# Patient Record
Sex: MALE | Race: WHITE | NOT HISPANIC OR LATINO | Employment: FULL TIME | URBAN - METROPOLITAN AREA
[De-identification: names, ages, dates, MRNs, and addresses within clinical notes are randomized per-mention and may not be internally consistent; named-entity substitution may affect disease eponyms.]

---

## 2017-01-13 ENCOUNTER — OFFICE VISIT (OUTPATIENT)
Dept: INTERNAL MEDICINE | Facility: CLINIC | Age: 57
End: 2017-01-13
Payer: COMMERCIAL

## 2017-01-13 VITALS
DIASTOLIC BLOOD PRESSURE: 70 MMHG | HEART RATE: 60 BPM | SYSTOLIC BLOOD PRESSURE: 114 MMHG | BODY MASS INDEX: 20.97 KG/M2 | TEMPERATURE: 98 F | WEIGHT: 130.5 LBS | RESPIRATION RATE: 16 BRPM | HEIGHT: 66 IN

## 2017-01-13 DIAGNOSIS — Z12.11 COLON CANCER SCREENING: ICD-10-CM

## 2017-01-13 DIAGNOSIS — Z00.00 WELL ADULT EXAM: Primary | ICD-10-CM

## 2017-01-13 PROCEDURE — 99396 PREV VISIT EST AGE 40-64: CPT | Mod: S$GLB,,, | Performed by: FAMILY MEDICINE

## 2017-01-13 PROCEDURE — 99999 PR PBB SHADOW E&M-EST. PATIENT-LVL III: CPT | Mod: PBBFAC,,, | Performed by: FAMILY MEDICINE

## 2017-01-13 NOTE — PROGRESS NOTES
Subjective:       Patient ID: Homar Brynes is a 56 y.o. male.    Chief Complaint: Annual Exam    HPI 56-year-old male presents to clinic today for annual physical exam.  He reports no significant past medical history, past surgical history, or family medical history.  He declines vaccination at this time.  Colonoscopy has been discussed and will be scheduled.  Review of Systems   Constitutional: Negative for appetite change, chills, fatigue and fever.   HENT: Negative for congestion, ear pain, hearing loss, postnasal drip, rhinorrhea, sinus pressure, sore throat and tinnitus.    Eyes: Negative for redness, itching and visual disturbance.   Respiratory: Negative for cough, chest tightness and shortness of breath.    Cardiovascular: Negative for chest pain and palpitations.   Gastrointestinal: Negative for abdominal pain, constipation, diarrhea, nausea and vomiting.   Genitourinary: Negative for decreased urine volume, difficulty urinating, dysuria, frequency, hematuria and urgency.   Musculoskeletal: Negative for back pain, myalgias, neck pain and neck stiffness.   Skin: Negative for rash.   Neurological: Negative for dizziness, light-headedness and headaches.   Psychiatric/Behavioral: Negative.        Objective:      Physical Exam   Constitutional: He is oriented to person, place, and time. He appears well-developed and well-nourished. No distress.   HENT:   Head: Normocephalic and atraumatic.   Right Ear: External ear normal.   Left Ear: External ear normal.   Nose: Nose normal.   Mouth/Throat: Oropharynx is clear and moist. No oropharyngeal exudate.   Eyes: Conjunctivae and EOM are normal. Pupils are equal, round, and reactive to light. Right eye exhibits no discharge. Left eye exhibits no discharge. No scleral icterus.   Neck: Normal range of motion. Neck supple. No JVD present. No tracheal deviation present. No thyromegaly present.   Cardiovascular: Normal rate, regular rhythm, normal heart sounds  and intact distal pulses.  Exam reveals no gallop and no friction rub.    No murmur heard.  Pulmonary/Chest: Effort normal and breath sounds normal. No stridor. No respiratory distress. He has no wheezes. He has no rales.   Abdominal: Soft. Bowel sounds are normal. He exhibits no distension and no mass. There is no tenderness. There is no rebound and no guarding.   Musculoskeletal: Normal range of motion. He exhibits no edema or tenderness.   Lymphadenopathy:     He has no cervical adenopathy.   Neurological: He is alert and oriented to person, place, and time.   Skin: Skin is warm and dry. No rash noted. He is not diaphoretic. No erythema. No pallor.   Psychiatric: He has a normal mood and affect. His behavior is normal. Judgment and thought content normal.   Nursing note and vitals reviewed.      Assessment:       1. Well adult exam    2. Colon cancer screening        Plan:       1.  CBC, CMP, UA, TSH, free T4, fasting lipids, vitamin D level, PSA, and he will been A1c.  2.  Screening colonoscopy.  3.  Return to clinic as needed or in one year for annual exam.  She

## 2017-01-13 NOTE — MR AVS SNAPSHOT
[place] [facility]   [facility]  [place] [address]  Phone: [phone]  Fax: [phone]                  Homar Byrnes   2017 3:00 PM   Office Visit    Description:  Male : 1960   Provider:  Jesse Fleming MD   Department:  [place] [facility]           Reason for Visit     Annual Exam           Diagnoses this Visit        Comments    Well adult exam    -  Primary     Colon cancer screening                To Do List           To Schedule:     Please call the Endoscopy Department at [phone] to schedule your appointment.          Goals (5 Years of Data)     None      Follow-Up and Disposition     Return in about 1 year (around 2018), or if symptoms worsen or fail to improve.      [facility] On [facility]     [facility] On Call Nurse Care Line -  Assistance  Registered nurses in the [facility] On Call Center provide clinical advisement, health education, appointment booking, and other advisory services.  Call for this free service at [phone].             Medications           Message regarding Medications     Verify the changes and/or additions to your medication regime listed below are the same as discussed with your clinician today.  If any of these changes or additions are incorrect, please notify your healthcare provider.        STOP taking these medications     methocarbamol (ROBAXIN) 750 MG Tab Take 1 tablet (750 mg total) by mouth 4 (four) times daily as needed (Muscle strain).    methocarbamol (ROBAXIN) 750 MG Tab TAKE 1 TABLET (750 MG TOTAL) BY MOUTH 4 (FOUR) TIMES DAILY AS NEEDED (MUSCLE STRAIN).    meloxicam (MOBIC) 15 MG tablet Take 1 tablet (15 mg total) by mouth once daily.           Verify that the below list of medications is an accurate representation of the medications you are currently taking.  If none reported, the list may be blank. If incorrect, please contact your healthcare provider. Carry this list with you in  "case of emergency.           Current Medications            Clinical Reference Information           Vital Signs - Last Recorded  Most recent update: 1/13/2017  3:06 PM by Emily Matias LPN    BP Pulse Temp Resp Ht Wt    114/70 (BP Location: Left arm, Patient Position: Sitting, BP Method: Manual) 60 98.4 °F (36.9 °C) (Oral) 16 5' 6" (1.676 m) 59.2 kg (130 lb 8.2 oz)    BMI                21.07 kg/m2          Blood Pressure          Most Recent Value    BP  114/70      Allergies as of 1/13/2017     No Known Allergies      Immunizations Administered on Date of Encounter - 1/13/2017     None      Orders Placed During Today's Visit      Normal Orders This Visit    Case request GI: COLONOSCOPY       "

## 2017-01-17 ENCOUNTER — LAB VISIT (OUTPATIENT)
Dept: LAB | Facility: HOSPITAL | Age: 57
End: 2017-01-17
Attending: FAMILY MEDICINE
Payer: COMMERCIAL

## 2017-01-17 DIAGNOSIS — Z12.5 PROSTATE CANCER SCREENING: ICD-10-CM

## 2017-01-17 DIAGNOSIS — Z00.00 WELL ADULT EXAM: ICD-10-CM

## 2017-01-17 LAB
25(OH)D3+25(OH)D2 SERPL-MCNC: 35 NG/ML
ALBUMIN SERPL BCP-MCNC: 4.1 G/DL
ALP SERPL-CCNC: 64 U/L
ALT SERPL W/O P-5'-P-CCNC: 24 U/L
ANION GAP SERPL CALC-SCNC: 7 MMOL/L
AST SERPL-CCNC: 21 U/L
BASOPHILS # BLD AUTO: 0.02 K/UL
BASOPHILS NFR BLD: 0.4 %
BILIRUB SERPL-MCNC: 0.6 MG/DL
BUN SERPL-MCNC: 13 MG/DL
CALCIUM SERPL-MCNC: 9.3 MG/DL
CHLORIDE SERPL-SCNC: 104 MMOL/L
CHOLEST/HDLC SERPL: 3.4 {RATIO}
CO2 SERPL-SCNC: 29 MMOL/L
COMPLEXED PSA SERPL-MCNC: 3.5 NG/ML
CREAT SERPL-MCNC: 1.2 MG/DL
DIFFERENTIAL METHOD: NORMAL
EOSINOPHIL # BLD AUTO: 0.2 K/UL
EOSINOPHIL NFR BLD: 3.2 %
ERYTHROCYTE [DISTWIDTH] IN BLOOD BY AUTOMATED COUNT: 12.9 %
EST. GFR  (AFRICAN AMERICAN): >60 ML/MIN/1.73 M^2
EST. GFR  (NON AFRICAN AMERICAN): >60 ML/MIN/1.73 M^2
GLUCOSE SERPL-MCNC: 87 MG/DL
HCT VFR BLD AUTO: 48.6 %
HDL/CHOLESTEROL RATIO: 29.3 %
HDLC SERPL-MCNC: 205 MG/DL
HDLC SERPL-MCNC: 60 MG/DL
HGB BLD-MCNC: 16.4 G/DL
LDLC SERPL CALC-MCNC: 124 MG/DL
LYMPHOCYTES # BLD AUTO: 2 K/UL
LYMPHOCYTES NFR BLD: 35.1 %
MCH RBC QN AUTO: 29.7 PG
MCHC RBC AUTO-ENTMCNC: 33.7 %
MCV RBC AUTO: 88 FL
MONOCYTES # BLD AUTO: 0.5 K/UL
MONOCYTES NFR BLD: 8.9 %
NEUTROPHILS # BLD AUTO: 2.9 K/UL
NEUTROPHILS NFR BLD: 52 %
NONHDLC SERPL-MCNC: 145 MG/DL
PLATELET # BLD AUTO: 183 K/UL
PMV BLD AUTO: 12.7 FL
POTASSIUM SERPL-SCNC: 3.7 MMOL/L
PROT SERPL-MCNC: 7.6 G/DL
RBC # BLD AUTO: 5.53 M/UL
SODIUM SERPL-SCNC: 140 MMOL/L
T4 FREE SERPL-MCNC: 0.83 NG/DL
TRIGL SERPL-MCNC: 105 MG/DL
TSH SERPL DL<=0.005 MIU/L-ACNC: 2.17 UIU/ML
WBC # BLD AUTO: 5.61 K/UL

## 2017-01-17 PROCEDURE — 82306 VITAMIN D 25 HYDROXY: CPT

## 2017-01-17 PROCEDURE — 36415 COLL VENOUS BLD VENIPUNCTURE: CPT | Mod: PO

## 2017-01-17 PROCEDURE — 80053 COMPREHEN METABOLIC PANEL: CPT

## 2017-01-17 PROCEDURE — 80061 LIPID PANEL: CPT

## 2017-01-17 PROCEDURE — 85025 COMPLETE CBC W/AUTO DIFF WBC: CPT

## 2017-01-17 PROCEDURE — 84443 ASSAY THYROID STIM HORMONE: CPT

## 2017-01-17 PROCEDURE — 84439 ASSAY OF FREE THYROXINE: CPT

## 2017-01-17 PROCEDURE — 84153 ASSAY OF PSA TOTAL: CPT

## 2017-01-17 PROCEDURE — 83036 HEMOGLOBIN GLYCOSYLATED A1C: CPT

## 2017-01-18 LAB
ESTIMATED AVG GLUCOSE: 105 MG/DL
HBA1C MFR BLD HPLC: 5.3 %

## 2018-01-02 ENCOUNTER — TELEPHONE (OUTPATIENT)
Dept: INTERNAL MEDICINE | Facility: CLINIC | Age: 58
End: 2018-01-02

## 2018-01-02 DIAGNOSIS — Z00.00 WELL ADULT EXAM: Primary | ICD-10-CM

## 2018-01-02 NOTE — TELEPHONE ENCOUNTER
----- Message from Marina Laughlin sent at 1/2/2018 11:26 AM CST -----  Contact: Self [phone]  Doctor appointment and lab have been scheduled.  Please link lab orders to the lab appointment.  Date of doctor appointment:    Physical or EP:  01/30  Date of lab appointment: 01/23   Comments:

## 2018-01-04 NOTE — TELEPHONE ENCOUNTER
Pt is scheduled to see Dr. Fleming for his physical.  Still ordering labs, or send to Dr. Fleming's office?

## 2018-01-16 ENCOUNTER — OFFICE VISIT (OUTPATIENT)
Dept: INTERNAL MEDICINE | Facility: CLINIC | Age: 58
End: 2018-01-16
Payer: COMMERCIAL

## 2018-01-16 VITALS
SYSTOLIC BLOOD PRESSURE: 112 MMHG | BODY MASS INDEX: 20.05 KG/M2 | RESPIRATION RATE: 16 BRPM | DIASTOLIC BLOOD PRESSURE: 80 MMHG | WEIGHT: 124.75 LBS | TEMPERATURE: 98 F | HEIGHT: 66 IN | HEART RATE: 70 BPM

## 2018-01-16 DIAGNOSIS — K40.90 RIGHT INGUINAL HERNIA: Primary | ICD-10-CM

## 2018-01-16 PROCEDURE — 99214 OFFICE O/P EST MOD 30 MIN: CPT | Mod: S$GLB,,, | Performed by: FAMILY MEDICINE

## 2018-01-16 PROCEDURE — 99999 PR PBB SHADOW E&M-EST. PATIENT-LVL III: CPT | Mod: PBBFAC,,, | Performed by: FAMILY MEDICINE

## 2018-01-16 NOTE — PROGRESS NOTES
Subjective:       Patient ID: Homar Byrnes is a 57 y.o. male.    Chief Complaint: Mass (mass on right hip/groin area that pops in and out)    HPI 57-year-old male presents to clinic today secondary to a complaints of a hernia to the right inguinal region that he has noticed for the past week.  He reports that the hernia comes and goes and denies any pain to the area.  Review of Systems   Constitutional: Negative for appetite change, chills, fatigue and fever.   HENT: Negative for congestion, ear pain, hearing loss, postnasal drip, rhinorrhea, sinus pressure, sore throat and tinnitus.    Eyes: Negative for redness, itching and visual disturbance.   Respiratory: Negative for cough, chest tightness and shortness of breath.    Cardiovascular: Negative for chest pain and palpitations.   Gastrointestinal: Negative for abdominal pain, constipation, diarrhea, nausea and vomiting.   Genitourinary: Negative for decreased urine volume, difficulty urinating, dysuria, frequency, hematuria and urgency.        Right groin hernia     Musculoskeletal: Negative for back pain, myalgias, neck pain and neck stiffness.   Skin: Negative for rash.   Neurological: Negative for dizziness, light-headedness and headaches.   Psychiatric/Behavioral: Negative.        Objective:      Physical Exam   Constitutional: He is oriented to person, place, and time. He appears well-developed and well-nourished. No distress.   HENT:   Head: Normocephalic and atraumatic.   Right Ear: External ear normal.   Left Ear: External ear normal.   Nose: Nose normal.   Mouth/Throat: Oropharynx is clear and moist. No oropharyngeal exudate.   Eyes: Conjunctivae and EOM are normal. Pupils are equal, round, and reactive to light. Right eye exhibits no discharge. Left eye exhibits no discharge. No scleral icterus.   Neck: Normal range of motion. Neck supple. No JVD present. No tracheal deviation present. No thyromegaly present.   Cardiovascular: Normal rate,  regular rhythm, normal heart sounds and intact distal pulses.  Exam reveals no gallop and no friction rub.    No murmur heard.  Pulmonary/Chest: Effort normal and breath sounds normal. No stridor. No respiratory distress. He has no wheezes. He has no rales.   Abdominal: Soft. Bowel sounds are normal. He exhibits no distension and no mass. There is no tenderness. There is no rebound and no guarding. A hernia is present. Hernia confirmed positive in the right inguinal area (reducible). Hernia confirmed negative in the left inguinal area.   Musculoskeletal: Normal range of motion. He exhibits no edema or tenderness.   Lymphadenopathy:     He has no cervical adenopathy. No inguinal adenopathy noted on the right or left side.   Neurological: He is alert and oriented to person, place, and time.   Skin: Skin is warm and dry. No rash noted. He is not diaphoretic. No erythema. No pallor.   Psychiatric: He has a normal mood and affect. His behavior is normal. Judgment and thought content normal.   Nursing note and vitals reviewed.      Assessment:       1. Right inguinal hernia        Plan:       1.  Refer to general surgery for further evaluation and treatment of reducible right inguinal hernia.  2.  Return to clinic as needed if symptoms persist or worsen.

## 2018-01-23 ENCOUNTER — LAB VISIT (OUTPATIENT)
Dept: LAB | Facility: HOSPITAL | Age: 58
End: 2018-01-23
Attending: FAMILY MEDICINE
Payer: COMMERCIAL

## 2018-01-23 DIAGNOSIS — Z00.00 WELL ADULT EXAM: ICD-10-CM

## 2018-01-23 LAB
25(OH)D3+25(OH)D2 SERPL-MCNC: 38 NG/ML
ALBUMIN SERPL BCP-MCNC: 3.8 G/DL
ALP SERPL-CCNC: 79 U/L
ALT SERPL W/O P-5'-P-CCNC: 19 U/L
ANION GAP SERPL CALC-SCNC: 10 MMOL/L
AST SERPL-CCNC: 23 U/L
BASOPHILS # BLD AUTO: 0.04 K/UL
BASOPHILS NFR BLD: 0.7 %
BILIRUB SERPL-MCNC: 0.6 MG/DL
BUN SERPL-MCNC: 14 MG/DL
CALCIUM SERPL-MCNC: 9.3 MG/DL
CHLORIDE SERPL-SCNC: 103 MMOL/L
CHOLEST SERPL-MCNC: 187 MG/DL
CHOLEST/HDLC SERPL: 3.2 {RATIO}
CO2 SERPL-SCNC: 26 MMOL/L
CREAT SERPL-MCNC: 0.9 MG/DL
DIFFERENTIAL METHOD: NORMAL
EOSINOPHIL # BLD AUTO: 0.3 K/UL
EOSINOPHIL NFR BLD: 6.2 %
ERYTHROCYTE [DISTWIDTH] IN BLOOD BY AUTOMATED COUNT: 12.2 %
EST. GFR  (AFRICAN AMERICAN): >60 ML/MIN/1.73 M^2
EST. GFR  (NON AFRICAN AMERICAN): >60 ML/MIN/1.73 M^2
ESTIMATED AVG GLUCOSE: 97 MG/DL
GLUCOSE SERPL-MCNC: 85 MG/DL
HBA1C MFR BLD HPLC: 5 %
HCT VFR BLD AUTO: 47.9 %
HDLC SERPL-MCNC: 58 MG/DL
HDLC SERPL: 31 %
HGB BLD-MCNC: 16.2 G/DL
IMM GRANULOCYTES # BLD AUTO: 0.02 K/UL
IMM GRANULOCYTES NFR BLD AUTO: 0.4 %
LDLC SERPL CALC-MCNC: 105 MG/DL
LYMPHOCYTES # BLD AUTO: 2.1 K/UL
LYMPHOCYTES NFR BLD: 37.9 %
MCH RBC QN AUTO: 29.7 PG
MCHC RBC AUTO-ENTMCNC: 33.8 G/DL
MCV RBC AUTO: 88 FL
MONOCYTES # BLD AUTO: 0.4 K/UL
MONOCYTES NFR BLD: 7.6 %
NEUTROPHILS # BLD AUTO: 2.6 K/UL
NEUTROPHILS NFR BLD: 47.2 %
NONHDLC SERPL-MCNC: 129 MG/DL
NRBC BLD-RTO: 0 /100 WBC
PLATELET # BLD AUTO: 162 K/UL
PMV BLD AUTO: 12.4 FL
POTASSIUM SERPL-SCNC: 3.9 MMOL/L
PROT SERPL-MCNC: 7.4 G/DL
RBC # BLD AUTO: 5.45 M/UL
SODIUM SERPL-SCNC: 139 MMOL/L
T4 FREE SERPL-MCNC: 0.81 NG/DL
TRIGL SERPL-MCNC: 120 MG/DL
TSH SERPL DL<=0.005 MIU/L-ACNC: 2.29 UIU/ML
WBC # BLD AUTO: 5.51 K/UL

## 2018-01-23 PROCEDURE — 84443 ASSAY THYROID STIM HORMONE: CPT

## 2018-01-23 PROCEDURE — 36415 COLL VENOUS BLD VENIPUNCTURE: CPT | Mod: PO

## 2018-01-23 PROCEDURE — 85025 COMPLETE CBC W/AUTO DIFF WBC: CPT

## 2018-01-23 PROCEDURE — 82306 VITAMIN D 25 HYDROXY: CPT

## 2018-01-23 PROCEDURE — 84439 ASSAY OF FREE THYROXINE: CPT

## 2018-01-23 PROCEDURE — 80061 LIPID PANEL: CPT

## 2018-01-23 PROCEDURE — 80053 COMPREHEN METABOLIC PANEL: CPT

## 2018-01-23 PROCEDURE — 83036 HEMOGLOBIN GLYCOSYLATED A1C: CPT

## 2018-01-26 ENCOUNTER — OFFICE VISIT (OUTPATIENT)
Dept: SURGERY | Facility: CLINIC | Age: 58
End: 2018-01-26
Payer: COMMERCIAL

## 2018-01-26 VITALS
HEART RATE: 60 BPM | BODY MASS INDEX: 21.17 KG/M2 | TEMPERATURE: 99 F | WEIGHT: 124 LBS | HEIGHT: 64 IN | DIASTOLIC BLOOD PRESSURE: 61 MMHG | SYSTOLIC BLOOD PRESSURE: 123 MMHG

## 2018-01-26 DIAGNOSIS — K40.90 RIGHT INGUINAL HERNIA: Primary | ICD-10-CM

## 2018-01-26 PROCEDURE — 99203 OFFICE O/P NEW LOW 30 MIN: CPT | Mod: S$GLB,,, | Performed by: SURGERY

## 2018-01-26 PROCEDURE — 99999 PR PBB SHADOW E&M-EST. PATIENT-LVL III: CPT | Mod: PBBFAC,,, | Performed by: SURGERY

## 2018-01-26 NOTE — LETTER
January 26, 2018      Jesse Fleming MD  [address] [facility] [place] [address]           [facility]  [address]  [place] [address]  Phone: [phone]          Patient: Homar Byrnes   MR Number: [id number]   YOB: 1960   Date of Visit: 1/26/2018       Dear Dr. Jesse Fleming:    Thank you for referring Homar Byrnes to me for evaluation. Attached you will find relevant portions of my assessment and plan of care.    If you have questions, please do not hesitate to call me. I look forward to following Homar Byrnes along with you.    Sincerely,    Alejandro Trimble Jr., MD    Enclosure  CC:  No Recipients    If you would like to receive this communication electronically, please contact [email or web address] or [phone] to request more information on [employer] Link access.    For providers and/or their staff who would like to refer a patient to [facility], please contact us through our one-stop-shop provider referral line, [facility], at [phone].    If you feel you have received this communication in error or would no longer like to receive these types of communications, please e-mail [email or web address]

## 2018-01-26 NOTE — PROGRESS NOTES
[facility] - General [facility]  General Surgery  History & Physical    Patient Name: Homar Byrnes  MRN: [id number]  Primary Care Provider: Jesse Fleming MD    Subjective:     Chief Complaint: Right inguinal bulge    History of Present Illness:  Patient is a 57 y.o. male presents with right inguinal bulge for 2 weeks.  Non-painful.  Able to be reduced easily.  No signs of incarceration, obstruction, or strangulation.  Otherwise healthy.  No other complaints.    No CP, SOB, GI complaints    No current outpatient prescriptions on file prior to visit.     No current facility-administered medications on file prior to visit.        Review of patient's allergies indicates:  No Known Allergies    No past medical history on file.  No past surgical history on file.  Family History     None        Social History Main Topics    Smoking status: Never Smoker    Smokeless tobacco: Not on file    Alcohol use No    Drug use: No    Sexual activity: Yes     Partners: Female     Review of Systems   10 point ROS negative  Objective:     Vital Signs (Most Recent):  Temp: 98.6 °F (37 °C) (01/26/18 0808)  Pulse: 60 (01/26/18 0808)  BP: 123/61 (01/26/18 0808) Vital Signs (24h Range):  [unfilled]     Weight: 56.2 kg (124 lb)  Body mass index is 21.28 kg/m².    Physical Exam  Gen: NAD  CV: RRR  Pulm: CTAB  GI: Soft, NT, ND.  No abdominal hernias  : Right inguinal hernia  Skin: No rashes  Neuro: Nonfocal      Assessment/Plan:     Right inguinal hernia, not obstructed or incarcerated    Pt does not desire repair at this time as it is asymptomtic  Will call with change in symptoms  Instructed patient on concerning symptoms that would warrant emergent eval      Nando Fernandez MD  General Surgery  [facility] - General Surgery    I have personally taken the history and examined this patient and agree with the resident's note as stated above.         Alejandro Trimble MD

## 2018-01-30 ENCOUNTER — DOCUMENTATION ONLY (OUTPATIENT)
Dept: INTERNAL MEDICINE | Facility: CLINIC | Age: 58
End: 2018-01-30

## 2018-01-30 ENCOUNTER — OFFICE VISIT (OUTPATIENT)
Dept: INTERNAL MEDICINE | Facility: CLINIC | Age: 58
End: 2018-01-30
Payer: COMMERCIAL

## 2018-01-30 VITALS
TEMPERATURE: 98 F | RESPIRATION RATE: 14 BRPM | DIASTOLIC BLOOD PRESSURE: 66 MMHG | BODY MASS INDEX: 21.91 KG/M2 | SYSTOLIC BLOOD PRESSURE: 114 MMHG | WEIGHT: 127.63 LBS | HEART RATE: 58 BPM

## 2018-01-30 DIAGNOSIS — K40.90 RIGHT INGUINAL HERNIA: ICD-10-CM

## 2018-01-30 DIAGNOSIS — Z12.11 COLON CANCER SCREENING: ICD-10-CM

## 2018-01-30 DIAGNOSIS — Z00.00 WELL ADULT EXAM: Primary | ICD-10-CM

## 2018-01-30 PROCEDURE — 99999 PR PBB SHADOW E&M-EST. PATIENT-LVL III: CPT | Mod: PBBFAC,,, | Performed by: FAMILY MEDICINE

## 2018-01-30 PROCEDURE — 99396 PREV VISIT EST AGE 40-64: CPT | Mod: S$GLB,,, | Performed by: FAMILY MEDICINE

## 2018-01-30 NOTE — PROGRESS NOTES
Subjective:       Patient ID: Homar Byrnes is a 57 y.o. male.    Chief Complaint: Annual Exam    HPI  57-year-old male presents to clinic today for annual physical exam.  He reports no significant past medical history, past surgical history, or family medical history.  He was recently seen secondary to a right reducible inguinal hernia for which she has been referred to surgery and at this time he does not desire surgery.  The patient has been instructed on concerning symptoms that would want emergent evaluation and at this time will wait for any changes in symptoms.  At this time he denies any abdominal pain.  Flu vaccine has been discussed but has been declined.  Finally, colon cancer screening has been discussed and the patient wishes to do FIT test screening.  Review of Systems   Constitutional: Negative for appetite change, chills, fatigue and fever.   HENT: Negative for congestion, ear pain, hearing loss, postnasal drip, rhinorrhea, sinus pressure, sore throat and tinnitus.    Eyes: Negative for redness, itching and visual disturbance.   Respiratory: Negative for cough, chest tightness and shortness of breath.    Cardiovascular: Negative for chest pain and palpitations.   Gastrointestinal: Negative for abdominal pain, constipation, diarrhea, nausea and vomiting.   Genitourinary: Negative for decreased urine volume, difficulty urinating, dysuria, frequency, hematuria and urgency.   Musculoskeletal: Negative for back pain, myalgias, neck pain and neck stiffness.   Skin: Negative for rash.   Neurological: Negative for dizziness, light-headedness and headaches.   Psychiatric/Behavioral: Negative.        Objective:      Physical Exam   Constitutional: He is oriented to person, place, and time. He appears well-developed and well-nourished. No distress.   HENT:   Head: Normocephalic and atraumatic.   Right Ear: External ear normal.   Left Ear: External ear normal.   Nose: Nose normal.   Mouth/Throat:  Oropharynx is clear and moist. No oropharyngeal exudate.   Eyes: Conjunctivae and EOM are normal. Pupils are equal, round, and reactive to light. Right eye exhibits no discharge. Left eye exhibits no discharge. No scleral icterus.   Neck: Normal range of motion. Neck supple. No JVD present. No tracheal deviation present. No thyromegaly present.   Cardiovascular: Normal rate, regular rhythm, normal heart sounds and intact distal pulses.  Exam reveals no gallop and no friction rub.    No murmur heard.  Pulmonary/Chest: Effort normal and breath sounds normal. No stridor. No respiratory distress. He has no wheezes. He has no rales.   Abdominal: Soft. Bowel sounds are normal. He exhibits no distension and no mass. There is no tenderness. There is no rebound and no guarding. A hernia is present. Hernia confirmed positive in the right inguinal area (reducible).   Musculoskeletal: Normal range of motion. He exhibits no edema or tenderness.   Lymphadenopathy:     He has no cervical adenopathy.   Neurological: He is alert and oriented to person, place, and time.   Skin: Skin is warm and dry. No rash noted. He is not diaphoretic. No erythema. No pallor.   Psychiatric: He has a normal mood and affect. His behavior is normal. Judgment and thought content normal.   Nursing note and vitals reviewed.      Assessment:       1. Well adult exam    2. Right inguinal hernia    3. Colon cancer screening        Plan:       1.  Labs have been reviewed and are within normal limits.  2.   Patient does not desire surgical repair at this time.  The patient has been instructed on concerning symptoms that would want emergent evaluation of inguinal hernia.  3.  Fit Test.   4.  Return to clinic as needed or in one year for annual exam.

## 2019-04-25 DIAGNOSIS — Z12.11 COLON CANCER SCREENING: ICD-10-CM

## 2020-10-05 ENCOUNTER — PATIENT MESSAGE (OUTPATIENT)
Dept: ADMINISTRATIVE | Facility: HOSPITAL | Age: 60
End: 2020-10-05

## 2021-01-04 ENCOUNTER — PATIENT MESSAGE (OUTPATIENT)
Dept: ADMINISTRATIVE | Facility: HOSPITAL | Age: 61
End: 2021-01-04

## 2021-04-26 ENCOUNTER — PATIENT MESSAGE (OUTPATIENT)
Dept: RESEARCH | Facility: HOSPITAL | Age: 61
End: 2021-04-26

## 2023-01-05 ENCOUNTER — LAB VISIT (OUTPATIENT)
Dept: LAB | Facility: HOSPITAL | Age: 63
End: 2023-01-05
Attending: INTERNAL MEDICINE
Payer: COMMERCIAL

## 2023-01-05 DIAGNOSIS — N40.0 BENIGN ENLARGEMENT OF PROSTATE: ICD-10-CM

## 2023-01-05 DIAGNOSIS — Z11.3 SCREENING EXAMINATION FOR VENEREAL DISEASE: ICD-10-CM

## 2023-01-05 DIAGNOSIS — R73.9 HYPERGLYCEMIA: ICD-10-CM

## 2023-01-05 DIAGNOSIS — D64.9 ANEMIA, UNSPECIFIED: ICD-10-CM

## 2023-01-05 DIAGNOSIS — E53.8 VITAMIN B12 DEFICIENCY (NON ANEMIC): ICD-10-CM

## 2023-01-05 DIAGNOSIS — Z11.59 SCREENING EXAMINATION FOR POLIOMYELITIS: ICD-10-CM

## 2023-01-05 DIAGNOSIS — R09.89 BRUIT: Primary | ICD-10-CM

## 2023-01-05 DIAGNOSIS — E78.2 MIXED HYPERLIPIDEMIA: Primary | ICD-10-CM

## 2023-01-05 LAB
ALBUMIN SERPL BCP-MCNC: 4.1 G/DL (ref 3.5–5.2)
ALP SERPL-CCNC: 70 U/L (ref 55–135)
ALT SERPL W/O P-5'-P-CCNC: 20 U/L (ref 10–44)
ANION GAP SERPL CALC-SCNC: 9 MMOL/L (ref 8–16)
AST SERPL-CCNC: 20 U/L (ref 10–40)
BASOPHILS # BLD AUTO: 0.03 K/UL (ref 0–0.2)
BASOPHILS NFR BLD: 0.6 % (ref 0–1.9)
BILIRUB SERPL-MCNC: 0.7 MG/DL (ref 0.1–1)
BUN SERPL-MCNC: 11 MG/DL (ref 8–23)
CALCIUM SERPL-MCNC: 9.4 MG/DL (ref 8.7–10.5)
CHLORIDE SERPL-SCNC: 106 MMOL/L (ref 95–110)
CHOLEST SERPL-MCNC: 190 MG/DL (ref 120–199)
CHOLEST/HDLC SERPL: 2.9 {RATIO} (ref 2–5)
CO2 SERPL-SCNC: 26 MMOL/L (ref 23–29)
CREAT SERPL-MCNC: 0.9 MG/DL (ref 0.5–1.4)
DIFFERENTIAL METHOD: NORMAL
EOSINOPHIL # BLD AUTO: 0.1 K/UL (ref 0–0.5)
EOSINOPHIL NFR BLD: 2.2 % (ref 0–8)
ERYTHROCYTE [DISTWIDTH] IN BLOOD BY AUTOMATED COUNT: 12.3 % (ref 11.5–14.5)
EST. GFR  (NO RACE VARIABLE): >60 ML/MIN/1.73 M^2
ESTIMATED AVG GLUCOSE: 105 MG/DL (ref 68–131)
FERRITIN SERPL-MCNC: 209 NG/ML (ref 20–300)
GLUCOSE SERPL-MCNC: 92 MG/DL (ref 70–110)
HBA1C MFR BLD: 5.3 % (ref 4–5.6)
HBV SURFACE AG SERPL QL IA: NORMAL
HCT VFR BLD AUTO: 46.2 % (ref 40–54)
HCV AB SERPL QL IA: NORMAL
HDLC SERPL-MCNC: 66 MG/DL (ref 40–75)
HDLC SERPL: 34.7 % (ref 20–50)
HGB BLD-MCNC: 15.6 G/DL (ref 14–18)
HIV 1+2 AB+HIV1 P24 AG SERPL QL IA: NORMAL
IMM GRANULOCYTES # BLD AUTO: 0.02 K/UL (ref 0–0.04)
IMM GRANULOCYTES NFR BLD AUTO: 0.4 % (ref 0–0.5)
IRON SERPL-MCNC: 133 UG/DL (ref 45–160)
IRON SERPL-MCNC: 133 UG/DL (ref 45–160)
LDLC SERPL CALC-MCNC: 108.8 MG/DL (ref 63–159)
LYMPHOCYTES # BLD AUTO: 2.1 K/UL (ref 1–4.8)
LYMPHOCYTES NFR BLD: 41.7 % (ref 18–48)
MCH RBC QN AUTO: 29.4 PG (ref 27–31)
MCHC RBC AUTO-ENTMCNC: 33.8 G/DL (ref 32–36)
MCV RBC AUTO: 87 FL (ref 82–98)
MONOCYTES # BLD AUTO: 0.4 K/UL (ref 0.3–1)
MONOCYTES NFR BLD: 8.3 % (ref 4–15)
NEUTROPHILS # BLD AUTO: 2.3 K/UL (ref 1.8–7.7)
NEUTROPHILS NFR BLD: 46.8 % (ref 38–73)
NONHDLC SERPL-MCNC: 124 MG/DL
NRBC BLD-RTO: 0 /100 WBC
PLATELET # BLD AUTO: 172 K/UL (ref 150–450)
PMV BLD AUTO: 11.4 FL (ref 9.2–12.9)
POTASSIUM SERPL-SCNC: 4.4 MMOL/L (ref 3.5–5.1)
PROSTATE SPECIFIC ANTIGEN, TOTAL: 6.6 NG/ML (ref 0–4)
PROT SERPL-MCNC: 7.2 G/DL (ref 6–8.4)
PSA FREE MFR SERPL: 13.94 %
PSA FREE SERPL-MCNC: 0.92 NG/ML (ref 0–1.5)
RBC # BLD AUTO: 5.3 M/UL (ref 4.6–6.2)
SATURATED IRON: 50 % (ref 20–50)
SODIUM SERPL-SCNC: 141 MMOL/L (ref 136–145)
T4 FREE SERPL-MCNC: 0.75 NG/DL (ref 0.71–1.51)
TOTAL IRON BINDING CAPACITY: 268 UG/DL (ref 250–450)
TRANSFERRIN SERPL-MCNC: 181 MG/DL (ref 200–375)
TRIGL SERPL-MCNC: 76 MG/DL (ref 30–150)
TSH SERPL DL<=0.005 MIU/L-ACNC: 1.49 UIU/ML (ref 0.4–4)
VIT B12 SERPL-MCNC: 576 PG/ML (ref 210–950)
WBC # BLD AUTO: 4.96 K/UL (ref 3.9–12.7)

## 2023-01-05 PROCEDURE — 82607 VITAMIN B-12: CPT | Performed by: INTERNAL MEDICINE

## 2023-01-05 PROCEDURE — 82728 ASSAY OF FERRITIN: CPT | Performed by: INTERNAL MEDICINE

## 2023-01-05 PROCEDURE — 87340 HEPATITIS B SURFACE AG IA: CPT | Performed by: INTERNAL MEDICINE

## 2023-01-05 PROCEDURE — 85025 COMPLETE CBC W/AUTO DIFF WBC: CPT | Performed by: INTERNAL MEDICINE

## 2023-01-05 PROCEDURE — 87521 HEPATITIS C PROBE&RVRS TRNSC: CPT | Performed by: INTERNAL MEDICINE

## 2023-01-05 PROCEDURE — 84153 ASSAY OF PSA TOTAL: CPT | Performed by: INTERNAL MEDICINE

## 2023-01-05 PROCEDURE — 84466 ASSAY OF TRANSFERRIN: CPT | Performed by: INTERNAL MEDICINE

## 2023-01-05 PROCEDURE — 86696 HERPES SIMPLEX TYPE 2 TEST: CPT | Performed by: INTERNAL MEDICINE

## 2023-01-05 PROCEDURE — 83036 HEMOGLOBIN GLYCOSYLATED A1C: CPT | Performed by: INTERNAL MEDICINE

## 2023-01-05 PROCEDURE — 86803 HEPATITIS C AB TEST: CPT | Performed by: INTERNAL MEDICINE

## 2023-01-05 PROCEDURE — 84443 ASSAY THYROID STIM HORMONE: CPT | Performed by: INTERNAL MEDICINE

## 2023-01-05 PROCEDURE — 87389 HIV-1 AG W/HIV-1&-2 AB AG IA: CPT | Performed by: INTERNAL MEDICINE

## 2023-01-05 PROCEDURE — 86592 SYPHILIS TEST NON-TREP QUAL: CPT | Performed by: INTERNAL MEDICINE

## 2023-01-05 PROCEDURE — 80053 COMPREHEN METABOLIC PANEL: CPT | Performed by: INTERNAL MEDICINE

## 2023-01-05 PROCEDURE — 84439 ASSAY OF FREE THYROXINE: CPT | Performed by: INTERNAL MEDICINE

## 2023-01-05 PROCEDURE — 80061 LIPID PANEL: CPT | Performed by: INTERNAL MEDICINE

## 2023-01-06 LAB — RPR SER QL: NORMAL

## 2023-01-07 LAB
HSV1 GG IGG SER-ACNC: 0.12 IV
HSV2 GG IGG SER-ACNC: 0.12 IV

## 2023-01-10 LAB — HCV RNA SERPL QL NAA+PROBE: NOT DETECTED

## 2025-04-14 ENCOUNTER — LAB VISIT (OUTPATIENT)
Dept: LAB | Facility: HOSPITAL | Age: 65
End: 2025-04-14
Payer: COMMERCIAL

## 2025-04-14 ENCOUNTER — OFFICE VISIT (OUTPATIENT)
Dept: INTERNAL MEDICINE | Facility: CLINIC | Age: 65
End: 2025-04-14
Payer: COMMERCIAL

## 2025-04-14 VITALS
HEIGHT: 66 IN | TEMPERATURE: 98 F | WEIGHT: 118.81 LBS | HEART RATE: 52 BPM | RESPIRATION RATE: 16 BRPM | DIASTOLIC BLOOD PRESSURE: 70 MMHG | BODY MASS INDEX: 19.09 KG/M2 | OXYGEN SATURATION: 98 % | SYSTOLIC BLOOD PRESSURE: 120 MMHG

## 2025-04-14 DIAGNOSIS — M54.50 ACUTE RIGHT-SIDED LOW BACK PAIN WITHOUT SCIATICA: Primary | ICD-10-CM

## 2025-04-14 DIAGNOSIS — R97.20 ELEVATED PSA: ICD-10-CM

## 2025-04-14 LAB — PSA SERPL-MCNC: 6.65 NG/ML

## 2025-04-14 PROCEDURE — 3074F SYST BP LT 130 MM HG: CPT | Mod: CPTII,S$GLB,,

## 2025-04-14 PROCEDURE — 3008F BODY MASS INDEX DOCD: CPT | Mod: CPTII,S$GLB,,

## 2025-04-14 PROCEDURE — 1125F AMNT PAIN NOTED PAIN PRSNT: CPT | Mod: CPTII,S$GLB,,

## 2025-04-14 PROCEDURE — 1101F PT FALLS ASSESS-DOCD LE1/YR: CPT | Mod: CPTII,S$GLB,,

## 2025-04-14 PROCEDURE — 3078F DIAST BP <80 MM HG: CPT | Mod: CPTII,S$GLB,,

## 2025-04-14 PROCEDURE — 3288F FALL RISK ASSESSMENT DOCD: CPT | Mod: CPTII,S$GLB,,

## 2025-04-14 PROCEDURE — 1159F MED LIST DOCD IN RCRD: CPT | Mod: CPTII,S$GLB,,

## 2025-04-14 PROCEDURE — 99999 PR PBB SHADOW E&M-EST. PATIENT-LVL III: CPT | Mod: PBBFAC,,,

## 2025-04-14 PROCEDURE — 1160F RVW MEDS BY RX/DR IN RCRD: CPT | Mod: CPTII,S$GLB,,

## 2025-04-14 PROCEDURE — 84153 ASSAY OF PSA TOTAL: CPT

## 2025-04-14 PROCEDURE — 36415 COLL VENOUS BLD VENIPUNCTURE: CPT | Mod: PO

## 2025-04-14 PROCEDURE — 99214 OFFICE O/P EST MOD 30 MIN: CPT | Mod: S$GLB,,,

## 2025-04-14 RX ORDER — METHOCARBAMOL 750 MG/1
750 TABLET, FILM COATED ORAL 4 TIMES DAILY PRN
Qty: 40 TABLET | Refills: 0 | Status: SHIPPED | OUTPATIENT
Start: 2025-04-14 | End: 2025-04-24

## 2025-04-14 NOTE — PROGRESS NOTES
Homar Byrnes  1960        Subjective     Chief Complaint: Back Pain      History of Present Illness:  Mr. Homar Byrnes is a 64 y.o. male who presents to clinic for back pain.      Patient with acute right sided lower back pain without sciatica after mowing lawn on Saturday.  Reports tightness in back and intermittent pain with minimal relief with OTC ibuprofen 400mg.  Has history of elevated PSA in 2023 and was following urology however did not undergo biopsy for noticed nodules.  Denies weight loss, B symptoms, red flag symptoms.    Review of Systems   Constitutional:  Negative for fever and weight loss.   Respiratory:  Negative for shortness of breath.    Cardiovascular:  Negative for chest pain.   Gastrointestinal:  Negative for abdominal pain, nausea and vomiting.   Genitourinary:  Negative for dysuria and hematuria.   Musculoskeletal:  Positive for back pain. Negative for neck pain.        PAST HISTORY:     History reviewed. No pertinent past medical history.    History reviewed. No pertinent surgical history.    Family History   Problem Relation Name Age of Onset    Diabetes Neg Hx      Heart disease Neg Hx      Hyperlipidemia Neg Hx      Stroke Neg Hx         Social History     Socioeconomic History    Marital status:     Number of children: 2   Occupational History    Occupation:     Tobacco Use    Smoking status: Never    Smokeless tobacco: Never   Substance and Sexual Activity    Alcohol use: No     Alcohol/week: 0.0 standard drinks of alcohol    Drug use: No    Sexual activity: Yes     Partners: Female       MEDICATIONS & ALLERGIES:     No current outpatient medications on file prior to visit.     No current facility-administered medications on file prior to visit.       Review of patient's allergies indicates:  No Known Allergies    OBJECTIVE:     Vital Signs:  Vitals:    04/14/25 1259   BP: 120/70   BP Location: Right arm   Patient Position: Sitting   Pulse:  "(!) 52   Resp: 16   Temp: 97.6 °F (36.4 °C)   TempSrc: Temporal   SpO2: 98%   Weight: 53.9 kg (118 lb 13.3 oz)   Height: 5' 6" (1.676 m)       Body mass index is 19.18 kg/m².     Physical Exam:  Physical Exam  Vitals and nursing note reviewed.   Constitutional:       General: He is not in acute distress.     Appearance: He is not ill-appearing.   HENT:      Head: Normocephalic and atraumatic.      Mouth/Throat:      Mouth: Mucous membranes are moist.      Pharynx: Oropharynx is clear.   Eyes:      Extraocular Movements: Extraocular movements intact.      Conjunctiva/sclera: Conjunctivae normal.   Cardiovascular:      Rate and Rhythm: Normal rate and regular rhythm.   Pulmonary:      Effort: Pulmonary effort is normal. No respiratory distress.      Breath sounds: Normal breath sounds. No wheezing or rales.   Chest:      Chest wall: No tenderness.   Abdominal:      Palpations: Abdomen is soft.      Tenderness: There is no right CVA tenderness or left CVA tenderness.   Musculoskeletal:         General: No tenderness. Normal range of motion.      Cervical back: Normal range of motion and neck supple. No tenderness.      Right lower leg: No edema.      Left lower leg: No edema.   Skin:     General: Skin is warm and dry.   Neurological:      Mental Status: He is alert and oriented to person, place, and time.      Sensory: No sensory deficit.      Motor: No weakness.            Laboratory  Lab Results   Component Value Date    WBC 4.96 01/05/2023    HGB 15.6 01/05/2023    HCT 46.2 01/05/2023    MCV 87 01/05/2023     01/05/2023     Lab Results   Component Value Date    GLU 92 01/05/2023     01/05/2023    K 4.4 01/05/2023     01/05/2023    CO2 26 01/05/2023    BUN 11 01/05/2023    CREATININE 0.9 01/05/2023    CALCIUM 9.4 01/05/2023     No results found for: "INR", "PROTIME"  Lab Results   Component Value Date    HGBA1C 5.3 01/05/2023     No results for input(s): "POCTGLUCOSE" in the last 72 " hours.      Health Maintenance         Date Due Completion Date    Colorectal Cancer Screening Never done ---    Shingles Vaccine (1 of 2) Never done ---    Pneumococcal Vaccines (Age 50+) (1 of 1 - PCV) Never done ---    Influenza Vaccine (1) Never done ---    COVID-19 Vaccine (2 - 2024-25 season) 09/01/2024 12/20/2021    TETANUS VACCINE 01/13/2027 1/13/2017 (Declined)    Override on 1/13/2017: Declined    Lipid Panel 01/05/2028 1/5/2023    RSV Vaccine (Age 60+ and Pregnant patients) (1 - 1-dose 75+ series) 10/31/2033 ---            ASSESSMENT & PLAN:   64 y.o. male who was seen today in clinic for acute right sided lower back pain without sciatica    Acute right-sided low back pain without sciatica  -     methocarbamoL (ROBAXIN) 750 MG Tab; Take 1 tablet (750 mg total) by mouth 4 (four) times daily as needed (muscle spasms).  Dispense: 40 tablet; Refill: 0    Elevated PSA  -     PSA, Screening; Future; Expected date: 04/14/2025         1. Acute right-sided low back pain without sciatica    2. Elevated PSA        1/2.  Right sided acute back pain after mowing lawn without sciatica.  No midline tenderness noted.  Has history of elevated PSA in 2023 and was following urology however did not undergo biopsy for noticed nodules.  Denies weight loss, B symptoms, red flag symptoms.  Continue conservative measurement with OTC NSAIDs, topicals, heating pads, stretching.  Robaxin sent to pharmacy.  PSA ordered.  Consider lower back xray and urology referral.      RTC as needed    Nima Mann MD  [facility]    This note was generated with the assistance of ambient listening technology. Verbal consent was obtained by the patient and accompanying visitor(s) for the recording of patient appointment to facilitate this note. I attest to having reviewed and edited the generated note for accuracy, though some syntax or spelling errors may persist. Please contact the author of this note for any clarification.

## 2025-04-15 ENCOUNTER — RESULTS FOLLOW-UP (OUTPATIENT)
Dept: INTERNAL MEDICINE | Facility: CLINIC | Age: 65
End: 2025-04-15

## 2025-04-15 DIAGNOSIS — R97.20 ELEVATED PSA: Primary | ICD-10-CM

## 2025-04-22 ENCOUNTER — OFFICE VISIT (OUTPATIENT)
Dept: UROLOGY | Facility: CLINIC | Age: 65
End: 2025-04-22
Payer: COMMERCIAL

## 2025-04-22 VITALS
BODY MASS INDEX: 20.55 KG/M2 | DIASTOLIC BLOOD PRESSURE: 78 MMHG | HEART RATE: 61 BPM | SYSTOLIC BLOOD PRESSURE: 131 MMHG | WEIGHT: 120.38 LBS | HEIGHT: 64 IN

## 2025-04-22 DIAGNOSIS — R97.20 ELEVATED PSA: ICD-10-CM

## 2025-04-22 PROCEDURE — 3078F DIAST BP <80 MM HG: CPT | Mod: CPTII,S$GLB,, | Performed by: UROLOGY

## 2025-04-22 PROCEDURE — 3075F SYST BP GE 130 - 139MM HG: CPT | Mod: CPTII,S$GLB,, | Performed by: UROLOGY

## 2025-04-22 PROCEDURE — 99999 PR PBB SHADOW E&M-EST. PATIENT-LVL III: CPT | Mod: PBBFAC,,, | Performed by: UROLOGY

## 2025-04-22 PROCEDURE — 99203 OFFICE O/P NEW LOW 30 MIN: CPT | Mod: S$GLB,,, | Performed by: UROLOGY

## 2025-04-22 PROCEDURE — 3288F FALL RISK ASSESSMENT DOCD: CPT | Mod: CPTII,S$GLB,, | Performed by: UROLOGY

## 2025-04-22 PROCEDURE — 3008F BODY MASS INDEX DOCD: CPT | Mod: CPTII,S$GLB,, | Performed by: UROLOGY

## 2025-04-22 PROCEDURE — 1101F PT FALLS ASSESS-DOCD LE1/YR: CPT | Mod: CPTII,S$GLB,, | Performed by: UROLOGY

## 2025-04-22 NOTE — PROGRESS NOTES
"[facility] [address]   Clinic Note    SUBJECTIVE:     Chief Complaint: elevated psa    History of Present Illness:  Homar Byrnes is a 64 y.o. male who presents to clinic for elevated PSA. He is new to our clinic referred by Dr. Nima Mann.    The patient was previously seen by [facility] for elevated PSA (6.6 in January 2023, 5.4 at time of repeat). MRI at the time with evidence of PI-RADS 3 lesion, however patient did not have biopsy. Believes that father had prostate cancer.    Patient reports LUTS, including nocturia x2-3, daytime frequency. Not bothered by LUTS. Denies gross hematuria or dysuria. Has never tried medication for LUTS.    OBJECTIVE:     Estimated body mass index is 20.66 kg/m² as calculated from the following:    Height as of this encounter: 5' 4" (1.626 m).    Weight as of this encounter: 54.6 kg (120 lb 5.9 oz).    Vital Signs (Most Recent)  Vitals:    04/22/25 1409   BP: 131/78   Pulse: 61       Physical Exam  Vitals reviewed.   Constitutional:       General: He is not in acute distress.     Appearance: He is not toxic-appearing.   HENT:      Head: Normocephalic and atraumatic.   Pulmonary:      Effort: Pulmonary effort is normal. No respiratory distress.      Breath sounds: No wheezing.   Abdominal:      General: There is no distension.   Skin:     General: Skin is warm and dry.   Neurological:      General: No focal deficit present.      Mental Status: He is alert and oriented to person, place, and time.   Psychiatric:         Mood and Affect: Mood normal.       Lab Results   Component Value Date    BUN 11 01/05/2023    CREATININE 0.9 01/05/2023    WBC 4.96 01/05/2023    HGB 15.6 01/05/2023    HCT 46.2 01/05/2023     01/05/2023    AST 20 01/05/2023    ALT 20 01/05/2023    ALKPHOS 70 01/05/2023    ALBUMIN 4.1 01/05/2023    HGBA1C 5.3 01/05/2023        Lab Results   Component Value Date    PSA 6.65 (H) 04/14/2025    PSA 3.5 01/17/2017    PSA 3.8 11/02/2015    PSA 1.8 " 06/16/2010    PSA 1.5 02/17/2009    PSAFREE 0.92 01/05/2023    PSAFREEPCT 13.94 01/05/2023       Urine dip showed no blood, leukocyte esterase, and nitrite. Negative protein.     ASSESSMENT     1. Elevated PSA      PLAN:   1. Elevated PSA  -     Ambulatory referral/consult to Urology  -     MRI Prostate W W/O Contrast; Future; Expected date: 04/22/2025       The natural history of prostate cancer and ongoing controversy regarding screening and potential treatment outcomes of prostate cancer has been discussed with the patient. The meaning of a false positive PSA and a false negative PSA has been discussed. He indicates understanding of the limitations of this screening test and wishes MRI of prostate to rule out any abnormal lesion of the prostate.  Briefly discuss the prostate biopsy.    Follow up for MRI of prostate.     Letter to Nima Mann MD

## 2025-05-21 ENCOUNTER — HOSPITAL ENCOUNTER (OUTPATIENT)
Dept: RADIOLOGY | Facility: HOSPITAL | Age: 65
Discharge: HOME | End: 2025-05-21
Attending: UROLOGY
Payer: COMMERCIAL

## 2025-05-21 DIAGNOSIS — R97.20 ELEVATED PSA: ICD-10-CM

## 2025-05-21 PROCEDURE — 72197 MRI PELVIS W/O & W/DYE: CPT | Mod: 26,,, | Performed by: RADIOLOGY

## 2025-05-21 PROCEDURE — 25500020 PHARM REV CODE 255: Performed by: UROLOGY

## 2025-05-21 PROCEDURE — 72197 MRI PELVIS W/O & W/DYE: CPT | Mod: TC

## 2025-05-21 PROCEDURE — A9585 GADOBUTROL INJECTION: HCPCS | Performed by: UROLOGY

## 2025-05-21 RX ORDER — GADOBUTROL 604.72 MG/ML
10 INJECTION INTRAVENOUS
Status: COMPLETED | OUTPATIENT
Start: 2025-05-21 | End: 2025-05-21

## 2025-05-21 RX ADMIN — GADOBUTROL 10 ML: 604.72 INJECTION INTRAVENOUS at 05:05

## 2025-05-23 ENCOUNTER — OFFICE VISIT (OUTPATIENT)
Dept: UROLOGY | Facility: CLINIC | Age: 65
End: 2025-05-23
Payer: COMMERCIAL

## 2025-05-23 VITALS
HEIGHT: 66 IN | BODY MASS INDEX: 19.27 KG/M2 | DIASTOLIC BLOOD PRESSURE: 67 MMHG | WEIGHT: 119.94 LBS | HEART RATE: 48 BPM | SYSTOLIC BLOOD PRESSURE: 118 MMHG

## 2025-05-23 DIAGNOSIS — R97.20 ELEVATED PSA: Primary | ICD-10-CM

## 2025-05-23 DIAGNOSIS — Z80.42 FAMILY HISTORY OF PROSTATE CANCER IN FATHER: ICD-10-CM

## 2025-05-23 PROCEDURE — 3288F FALL RISK ASSESSMENT DOCD: CPT | Mod: CPTII,S$GLB,, | Performed by: UROLOGY

## 2025-05-23 PROCEDURE — 3078F DIAST BP <80 MM HG: CPT | Mod: CPTII,S$GLB,, | Performed by: UROLOGY

## 2025-05-23 PROCEDURE — 1101F PT FALLS ASSESS-DOCD LE1/YR: CPT | Mod: CPTII,S$GLB,, | Performed by: UROLOGY

## 2025-05-23 PROCEDURE — 99999 PR PBB SHADOW E&M-EST. PATIENT-LVL III: CPT | Mod: PBBFAC,,, | Performed by: UROLOGY

## 2025-05-23 PROCEDURE — 1159F MED LIST DOCD IN RCRD: CPT | Mod: CPTII,S$GLB,, | Performed by: UROLOGY

## 2025-05-23 PROCEDURE — 3074F SYST BP LT 130 MM HG: CPT | Mod: CPTII,S$GLB,, | Performed by: UROLOGY

## 2025-05-23 PROCEDURE — 3008F BODY MASS INDEX DOCD: CPT | Mod: CPTII,S$GLB,, | Performed by: UROLOGY

## 2025-05-23 PROCEDURE — 99214 OFFICE O/P EST MOD 30 MIN: CPT | Mod: S$GLB,,, | Performed by: UROLOGY

## 2025-05-23 RX ORDER — CEFTRIAXONE 1 G/1
1 INJECTION, POWDER, FOR SOLUTION INTRAMUSCULAR; INTRAVENOUS
Status: SHIPPED | OUTPATIENT
Start: 2025-05-23 | End: 2025-05-23

## 2025-05-23 RX ORDER — ENEMA 19; 7 G/133ML; G/133ML
1 ENEMA RECTAL ONCE
Qty: 2 ENEMA | Refills: 0 | Status: SHIPPED | OUTPATIENT
Start: 2025-05-23 | End: 2025-05-23

## 2025-05-23 RX ORDER — CIPROFLOXACIN 500 MG/1
500 TABLET, FILM COATED ORAL 2 TIMES DAILY
Qty: 4 TABLET | Refills: 0 | Status: SHIPPED | OUTPATIENT
Start: 2025-05-23 | End: 2025-05-25

## 2025-05-23 RX ORDER — LIDOCAINE HYDROCHLORIDE 20 MG/ML
JELLY TOPICAL
Status: SHIPPED | OUTPATIENT
Start: 2025-05-30

## 2025-05-23 NOTE — PATIENT INSTRUCTIONS
The patient will be scheduled for a prostate biopsy.    The risks and benefits of the procedure were discussed with the patient in detail.  The risks include but are not limited to bleeding, infection, pain, bloody ejaculation, and need for further procedures.      The patient was told to stop all blood thinners at least one week prior to the procedure.    The patient will do a fleets enema the AM of the biopsy and take Cipro in the morning of the procedure before prostate bx.

## 2025-05-23 NOTE — PROGRESS NOTES
"[facility] - [facility] [address]   Clinic Note    SUBJECTIVE:     Chief Complaint: MRI prostate for elevated PSA 6.65    History of Present Illness:  Homar Byrnes is a 64 y.o. male who presents to clinic for elevated PSA. He is new to our clinic referred by No ref. provider found.    The patient was previously [place] by [facility] [place] (6.6 in January 2023, 5.4 at time of repeat). MRI at the time with evidence of PI-RADS 3 lesion, however patient did not have biopsy. Believes that father had prostate cancer.    Patient reports LUTS, including nocturia x2-3, daytime frequency. Not bothered by LUTS. Denies gross hematuria or dysuria. Has never tried medication for LUTS.    OBJECTIVE:     Estimated body mass index is 19.36 kg/m² as calculated from the following:    Height as of this encounter: 5' 6" (1.676 m).    Weight as of this encounter: 54.4 kg (119 lb 14.9 oz).    Vital Signs (Most Recent)  Vitals:    05/23/25 1058   BP: 118/67   Pulse: (!) 48         Physical Exam  Vitals reviewed.   Constitutional:       General: He is not in acute distress.     Appearance: He is not toxic-appearing.   HENT:      Head: Normocephalic and atraumatic.   Pulmonary:      Effort: Pulmonary effort is normal. No respiratory distress.      Breath sounds: No wheezing.   Abdominal:      General: There is no distension.   Skin:     General: Skin is warm and dry.   Neurological:      General: No focal deficit present.      Mental Status: He is alert and oriented to person, place, and time.   Psychiatric:         Mood and Affect: Mood normal.       Lab Results   Component Value Date    BUN 11 01/05/2023    CREATININE 0.9 01/05/2023    WBC 4.96 01/05/2023    HGB 15.6 01/05/2023    HCT 46.2 01/05/2023     01/05/2023    AST 20 01/05/2023    ALT 20 01/05/2023    ALKPHOS 70 01/05/2023    ALBUMIN 4.1 01/05/2023    HGBA1C 5.3 01/05/2023        Lab Results   Component Value Date    PSA 6.65 (H) 04/14/2025    PSA 3.5 " 01/17/2017    PSA 3.8 11/02/2015    PSA 1.8 06/16/2010    PSA 1.5 02/17/2009    PSAFREE 0.92 01/05/2023    PSAFREEPCT 13.94 01/05/2023       Urine dip showed no blood, leukocyte esterase, and nitrite. Negative protein.     Radiology:    MRI prostate 5/21/25  Previous biopsy: None   PSA: 6.65 ng/mL 04/14/2025   Prior therapy: None   Prostate: 4.5 x 3.8 x 3.1 cm corresponding to a computed volume of 27.83 cc.   Prostate density: 0.24   Study is limited by artifact from bowel gas making DWI and ADC nondiagnostic posteriorly and in the midline.   Peripheral zone: There is 1 suspicious lesion within the left posterolateral mid prostate peripheral zone.   Lesion (LOYDA) #P-1   Location: Side: left; Region: mid; Zone: posterior peripheral zone laterally   Greatest dimension: 1.3 cm   T2-WI: Heterogeneous signal intensity or non-circumscribed, rounded, moderate hypointensity, score 3.  DWI/ADC: Nondiagnostic   DCE: Positive   Prostate Margin: Tumor contact with capsule   PI-RADS assessment category: 4   Transitional zone: Benign prostatic hyperplasia without focal suspicious abnormality, score 2.  Neurovascular bundle: Normal   Seminal vesicles: Normal appearance.   Adjacent Organ Involvement: No evidence for urinary bladder or rectal invasion.   Lymphadenopathy: None.   Other Findings: None.   Impression:  There is a suspicious lesion within the left posterolateral mid prostate peripheral zone.  Overall Assessment: PI-RADS 4 - High (clinically significant cancer is likely to be present)  Extraprostatic extension: Negative   Number of targets created for potential MR/US fusion biopsy  Peripheral zone: 1  Transition zone: 0       ASSESSMENT     1. Elevated PSA    2. Family history of prostate cancer in father        PLAN:   1. Elevated PSA  -     Transrectal Ultrasound w/ Biopsy; Future  -     Specimen to Pathology Urology  -     sodium phosphates (FLEET ENEMA) 19-7 gram/118 mL Enem; Place 1 enema rectally once. for 1 dose   Dispense: 2 enema; Refill: 0  -     ciprofloxacin HCl (CIPRO) 500 MG tablet; Take 1 tablet (500 mg total) by mouth 2 (two) times daily. for 2 days  Dispense: 4 tablet; Refill: 0    2. Family history of prostate cancer in father    Other orders  -     LIDOcaine HCl 2% urojet  -     cefTRIAXone injection 1 g       Reviewed his MRI prostate in detail.    The patient will be scheduled for a prostate biopsy.    The risks and benefits of the procedure were discussed with the patient in detail.  The risks include but are not limited to bleeding, infection, pain, bloody ejaculation, and need for further procedures.      The patient was told to stop all blood thinners at least one week prior to the procedure.    The patient will do a fleets enema the AM of the biopsy and take antibiotics a night before the procedure.             Follow up in about 7 weeks (around 7/10/2025), or UroNav bx of prostate.     Letter to No ref. provider found

## 2025-05-27 ENCOUNTER — TELEPHONE (OUTPATIENT)
Dept: UROLOGY | Facility: CLINIC | Age: 65
End: 2025-05-27
Payer: COMMERCIAL

## 2025-05-27 NOTE — TELEPHONE ENCOUNTER
Pt notified that we are not able to do this. Dr camarena said he will pull the image up on the computer during the biopsy and he will show it to him at that time

## 2025-05-27 NOTE — TELEPHONE ENCOUNTER
----- Message from Nurse Chan sent at 5/27/2025  2:21 PM CDT -----  Regarding: FW: Advice  Contact: [phone]    ----- Message -----  From: Tracie Power  Sent: 5/27/2025   1:58 PM CDT  To: [facility] Staff  Subject: Advice                                           Type:  Needs Medical AdviceWho Called: Homar Byrnes Requesting a picture of the MRI circling the problem area (prostate) Would the patient rather a call back or a response via [email or web address] Call Back Number: [phone] Information: Email if possible

## 2025-05-28 ENCOUNTER — TELEPHONE (OUTPATIENT)
Dept: UROLOGY | Facility: CLINIC | Age: 65
End: 2025-05-28
Payer: COMMERCIAL

## 2025-05-28 NOTE — TELEPHONE ENCOUNTER
Contacted pt in regards to scheduling appt to go over imaging. Informed pt per Dr Terrazas that he will go over the results w/pt when he see's him for BX on 7/10/25. Pt states that he has a right to know what is going on with his results, that Dr Terrazas should have gone over them with him when he was last here. Pt verbalized understanding.

## 2025-05-29 ENCOUNTER — TELEPHONE (OUTPATIENT)
Dept: UROLOGY | Facility: CLINIC | Age: 65
End: 2025-05-29
Payer: COMMERCIAL

## 2025-05-29 NOTE — TELEPHONE ENCOUNTER
Copied from CRM #[id number]. Topic: Appointments - Appointment Rescheduling  >> May 29, 2025  9:17 AM Elisabeth wrote:  Pt is calling to speak to someone in the office to r/s his appt that he is currently scheduled for; on 07/10 no available appts in Epic. Please call to advise. [phone].     Patient's DX:     Additional Info:   wants sooner appt    Called pt in regards to scheduling appt/Dr Terrazas for a sooner date. Gave pt the next available date of 6/5/25 at 10:30 am. Pt accepted this time and date. Also reminded the pt of the instructions for the night and morning of the procedure per Dr Terrazas.

## 2025-06-04 ENCOUNTER — TELEPHONE (OUTPATIENT)
Dept: UROLOGY | Facility: CLINIC | Age: 65
End: 2025-06-04
Payer: COMMERCIAL

## 2025-06-05 ENCOUNTER — PROCEDURE VISIT (OUTPATIENT)
Dept: UROLOGY | Facility: CLINIC | Age: 65
End: 2025-06-05
Payer: COMMERCIAL

## 2025-06-05 VITALS
RESPIRATION RATE: 17 BRPM | SYSTOLIC BLOOD PRESSURE: 137 MMHG | WEIGHT: 119.25 LBS | HEART RATE: 58 BPM | DIASTOLIC BLOOD PRESSURE: 82 MMHG | BODY MASS INDEX: 19.25 KG/M2

## 2025-06-05 DIAGNOSIS — R97.20 ELEVATED PSA: ICD-10-CM

## 2025-06-05 RX ORDER — LIDOCAINE HYDROCHLORIDE 10 MG/ML
20 INJECTION, SOLUTION INFILTRATION; PERINEURAL
Status: COMPLETED | OUTPATIENT
Start: 2025-06-05 | End: 2025-06-05

## 2025-06-05 RX ORDER — CEFTRIAXONE 1 G/1
1 INJECTION, POWDER, FOR SOLUTION INTRAMUSCULAR; INTRAVENOUS
Status: COMPLETED | OUTPATIENT
Start: 2025-06-05 | End: 2025-06-05

## 2025-06-05 RX ADMIN — LIDOCAINE HYDROCHLORIDE 10 ML: 20 JELLY TOPICAL at 10:06

## 2025-06-05 RX ADMIN — LIDOCAINE HYDROCHLORIDE 20 ML: 10 INJECTION, SOLUTION INFILTRATION; PERINEURAL at 11:06

## 2025-06-05 RX ADMIN — CEFTRIAXONE 1 G: 1 INJECTION, POWDER, FOR SOLUTION INTRAMUSCULAR; INTRAVENOUS at 10:06

## 2025-06-09 ENCOUNTER — RESULTS FOLLOW-UP (OUTPATIENT)
Dept: UROLOGY | Facility: CLINIC | Age: 65
End: 2025-06-09

## 2025-06-09 ENCOUNTER — TELEPHONE (OUTPATIENT)
Dept: UROLOGY | Facility: CLINIC | Age: 65
End: 2025-06-09
Payer: COMMERCIAL

## 2025-06-09 NOTE — TELEPHONE ENCOUNTER
06/05/2025     Procedure:   UroNav MRI/US fusion biopsy of the prostate                                                                     Transrectal Ultrasound of the Prostate                                       Transrectal Ultrasound Guided Prostate Biopsy                                - With Pudendal Nerve Block                                              Findings:                                                                         --- Transrectal Ultrasound of the Prostate ---                               Prostate measurements.                                                                                                          PSA: Lab Results       Component                Value               Date                       PSA                      6.65 (H)            04/14/2025                 PSATOTAL                 6.6 (H)             01/05/2023                 PSAFREE                  0.92                01/05/2023                 PSAFREEPCT               13.94               01/05/2023                    - Volume: 44 gm.           PSA Density: 0.15                                                         The target lesion noted on the left apex close to the capsule: 5 biopsies noted.     MRI prostate 5/21/25  Previous biopsy: None   PSA: 6.65 ng/mL 04/14/2025   Prior therapy: None   Prostate: 4.5 x 3.8 x 3.1 cm corresponding to a computed volume of 27.83 cc.   Prostate density: 0.24   Study is limited by artifact from bowel gas making DWI and ADC nondiagnostic posteriorly and in the midline.   Peripheral zone: There is 1 suspicious lesion within the left posterolateral mid prostate peripheral zone.   Lesion (LOYDA) #P-1   Location: Side: left; Region: mid; Zone: posterior peripheral zone laterally   Greatest dimension: 1.3 cm   T2-WI: Heterogeneous signal intensity or non-circumscribed, rounded, moderate hypointensity, score 3.  DWI/ADC: Nondiagnostic   DCE: Positive   Prostate Margin: Tumor contact  with capsule   PI-RADS assessment category: 4   Transitional zone: Benign prostatic hyperplasia without focal suspicious abnormality, score 2.  Neurovascular bundle: Normal   Seminal vesicles: Normal appearance.   Adjacent Organ Involvement: No evidence for urinary bladder or rectal invasion.   Lymphadenopathy: None.   Other Findings: None.   Impression:  There is a suspicious lesion within the left posterolateral mid prostate peripheral zone.  Overall Assessment: PI-RADS 4 - High (clinically significant cancer is likely to be present)  Extraprostatic extension: Negative   Number of targets created for potential MR/US fusion biopsy  Peripheral zone: 1  Transition zone: 0    Pathology  Final Diagnosis   1. Prostate, Left [place], Needle Core Biopsy:     - Prostate adenocarcinoma, Deloit (3 + 3 = 6, Grade Group 1), involving 2 of 2 cores and approximately 15% of the total tissue.     2. Prostate, Left Mid, Needle Core Biopsy:     - Benign prostate glands and stroma.  - Negative for malignancy.     3. Prostate, Left Base, Needle Core Biopsy:     - Prostate adenocarcinoma, Ovidio (3 + 3 = 6, Grade Group 1), involving 1 of 2 cores and approximately 5% of the total tissue.     4. Prostate, Right [place], Needle Core Biopsy:     - Benign prostate glands and stroma.  - Negative for malignancy.     5. Prostate, Right Mid, Needle Core Biopsy:     - Atypical small acinar proliferation (ASAP).     6. Prostate, Right Base, Needle Core Biopsy:     - Prostate adenocarcinoma, Ovidio (3 + 3 = 6, Grade Group 1), involving 1 of 1 cores and approximately 10% of the total tissue.     7. Prostate, Target, Needle Core Biopsy:     - Prostate adenocarcinoma, Ovidio (3 + 4 = 7, Grade Group 2, 10% pattern 4), involving 4 of 5 cores and approximately 50% of the total tissue.       Newly diagnosed prostate cancer.  Deloit 3 + 4 Prostate cancer.  I would like to see him in person or a virtual visit to discuss the prostate bx result and his  prostate cancer management.

## 2025-06-10 ENCOUNTER — OFFICE VISIT (OUTPATIENT)
Dept: UROLOGY | Facility: CLINIC | Age: 65
End: 2025-06-10
Payer: COMMERCIAL

## 2025-06-10 VITALS
DIASTOLIC BLOOD PRESSURE: 66 MMHG | HEART RATE: 57 BPM | SYSTOLIC BLOOD PRESSURE: 113 MMHG | HEIGHT: 66 IN | WEIGHT: 119.06 LBS | BODY MASS INDEX: 19.13 KG/M2

## 2025-06-10 DIAGNOSIS — C61 PROSTATE CANCER: Primary | ICD-10-CM

## 2025-06-10 PROCEDURE — 3078F DIAST BP <80 MM HG: CPT | Mod: CPTII,S$GLB,, | Performed by: UROLOGY

## 2025-06-10 PROCEDURE — 99215 OFFICE O/P EST HI 40 MIN: CPT | Mod: S$GLB,,, | Performed by: UROLOGY

## 2025-06-10 PROCEDURE — 3074F SYST BP LT 130 MM HG: CPT | Mod: CPTII,S$GLB,, | Performed by: UROLOGY

## 2025-06-10 PROCEDURE — 99999 PR PBB SHADOW E&M-EST. PATIENT-LVL II: CPT | Mod: PBBFAC,,, | Performed by: UROLOGY

## 2025-06-10 PROCEDURE — 3008F BODY MASS INDEX DOCD: CPT | Mod: CPTII,S$GLB,, | Performed by: UROLOGY

## 2025-06-10 NOTE — PROGRESS NOTES
[facility] - Urology [address]   Clinic Note    SUBJECTIVE:     Chief Complaint: Prostate Biopsy path review    History of Present Illness:  Homar Byrnes is a 64 y.o. male who presents to clinic for elevated PSA. He is new to our clinic referred by No ref. provider found.    The patient was previously seen by [facility] for elevated PSA (6.6 in January 2023, 5.4 at time of repeat). MRI at the time with evidence of PI-RADS 3 lesion, however patient did not have biopsy. Believes that father had prostate cancer.    Patient reports LUTS, including nocturia x2-3, daytime frequency. Not bothered by LUTS. Denies gross hematuria or dysuria. Has never tried medication for LUTS.    6/10/25: Here today to review prostate path review. This showed 3+4=7 prostate cancer.    Abdominal Sx: right open inguinal hernia repair with mesh 3-5 years ago at [facility]    Blood thinners: None    06/05/2025   Procedure:   UroNav MRI/US fusion biopsy of the prostate                                                                     Transrectal Ultrasound of the Prostate                                       Transrectal Ultrasound Guided Prostate Biopsy                                - With Pudendal Nerve Block                                              Findings:                                                                         --- Transrectal Ultrasound of the Prostate ---                               Prostate measurements.                                                                                                          PSA: Lab Results       Component                Value               Date                       PSA                      6.65 (H)            04/14/2025                 PSATOTAL                 6.6 (H)             01/05/2023                 PSAFREE                  0.92                01/05/2023                 PSAFREEPCT               13.94               01/05/2023                    - Volume: 44 gm.            PSA Density: 0.15                                                         The target lesion noted on the left apex close to the capsule: 5 biopsies noted.     MRI prostate 5/21/25  Previous biopsy: None   PSA: 6.65 ng/mL 04/14/2025   Prior therapy: None   Prostate: 4.5 x 3.8 x 3.1 cm corresponding to a computed volume of 27.83 cc.   Prostate density: 0.24   Study is limited by artifact from bowel gas making DWI and ADC nondiagnostic posteriorly and in the midline.   Peripheral zone: There is 1 suspicious lesion within the left posterolateral mid prostate peripheral zone.   Lesion (LOYDA) #P-1   Location: Side: left; Region: mid; Zone: posterior peripheral zone laterally   Greatest dimension: 1.3 cm   T2-WI: Heterogeneous signal intensity or non-circumscribed, rounded, moderate hypointensity, score 3.  DWI/ADC: Nondiagnostic   DCE: Positive   Prostate Margin: Tumor contact with capsule   PI-RADS assessment category: 4   Transitional zone: Benign prostatic hyperplasia without focal suspicious abnormality, score 2.  Neurovascular bundle: Normal   Seminal vesicles: Normal appearance.   Adjacent Organ Involvement: No evidence for urinary bladder or rectal invasion.   Lymphadenopathy: None.   Other Findings: None.   Impression:  There is a suspicious lesion within the left posterolateral mid prostate peripheral zone.  Overall Assessment: PI-RADS 4 - High (clinically significant cancer is likely to be present)  Extraprostatic extension: Negative   Number of targets created for potential MR/US fusion biopsy  Peripheral zone: 1  Transition zone: 0    Pathology  Final Diagnosis   1. Prostate, Left [place], Needle Core Biopsy:     - Prostate adenocarcinoma, Ovidio (3 + 3 = 6, Grade Group 1), involving 2 of 2 cores and approximately 15% of the total tissue.     2. Prostate, Left Mid, Needle Core Biopsy:     - Benign prostate glands and stroma.  - Negative for malignancy.     3. Prostate, Left Base, Needle Core Biopsy:     -  "Prostate adenocarcinoma, [place] (3 + 3 = 6, Grade Group 1), involving 1 of 2 cores and approximately 5% of the total tissue.     4. Prostate, Right [place], Needle Core Biopsy:     - Benign prostate glands and stroma.  - Negative for malignancy.     5. Prostate, Right Mid, Needle Core Biopsy:     - Atypical small acinar proliferation (ASAP).     6. Prostate, Right Base, Needle Core Biopsy:     - Prostate adenocarcinoma, [place] (3 + 3 = 6, Grade Group 1), involving 1 of 1 cores and approximately 10% of the total tissue.     7. Prostate, Target, Needle Core Biopsy:     - Prostate adenocarcinoma, Ovidio (3 + 4 = 7, Grade Group 2, 10% pattern 4), involving 4 of 5 cores and approximately 50% of the total tissue.       OBJECTIVE:     Estimated body mass index is 19.21 kg/m² as calculated from the following:    Height as of this encounter: 5' 6" (1.676 m).    Weight as of this encounter: 54 kg (119 lb 0.8 oz).    Vital Signs (Most Recent)  Vitals:    06/10/25 1302   BP: 113/66   Pulse: (!) 57         Physical Exam  Vitals reviewed.   Constitutional:       General: He is not in acute distress.     Appearance: He is not toxic-appearing.   HENT:      Head: Normocephalic and atraumatic.   Pulmonary:      Effort: Pulmonary effort is normal. No respiratory distress.      Breath sounds: No wheezing.   Abdominal:      General: There is no distension.   Skin:     General: Skin is warm and dry.   Neurological:      General: No focal deficit present.      Mental Status: He is alert and oriented to person, place, and time.   Psychiatric:         Mood and Affect: Mood normal.       Lab Results   Component Value Date    BUN 11 01/05/2023    CREATININE 0.9 01/05/2023    WBC 4.96 01/05/2023    HGB 15.6 01/05/2023    HCT 46.2 01/05/2023     01/05/2023    AST 20 01/05/2023    ALT 20 01/05/2023    ALKPHOS 70 01/05/2023    ALBUMIN 4.1 01/05/2023    HGBA1C 5.3 01/05/2023        Lab Results   Component Value Date    PSA 6.65 (H) " 04/14/2025    PSA 3.5 01/17/2017    PSA 3.8 11/02/2015    PSA 1.8 06/16/2010    PSA 1.5 02/17/2009    PSAFREE 0.92 01/05/2023    PSAFREEPCT 13.94 01/05/2023       Urine dip showed no blood, leukocyte esterase, and nitrite. Negative protein.     Radiology:    MRI prostate 5/21/25  Previous biopsy: None   PSA: 6.65 ng/mL 04/14/2025   Prior therapy: None   Prostate: 4.5 x 3.8 x 3.1 cm corresponding to a computed volume of 27.83 cc.   Prostate density: 0.24   Study is limited by artifact from bowel gas making DWI and ADC nondiagnostic posteriorly and in the midline.   Peripheral zone: There is 1 suspicious lesion within the left posterolateral mid prostate peripheral zone.   Lesion (LOYDA) #P-1   Location: Side: left; Region: mid; Zone: posterior peripheral zone laterally   Greatest dimension: 1.3 cm   T2-WI: Heterogeneous signal intensity or non-circumscribed, rounded, moderate hypointensity, score 3.  DWI/ADC: Nondiagnostic   DCE: Positive   Prostate Margin: Tumor contact with capsule   PI-RADS assessment category: 4   Transitional zone: Benign prostatic hyperplasia without focal suspicious abnormality, score 2.  Neurovascular bundle: Normal   Seminal vesicles: Normal appearance.   Adjacent Organ Involvement: No evidence for urinary bladder or rectal invasion.   Lymphadenopathy: None.   Other Findings: None.   Impression:  There is a suspicious lesion within the left posterolateral mid prostate peripheral zone.  Overall Assessment: PI-RADS 4 - High (clinically significant cancer is likely to be present)  Extraprostatic extension: Negative   Number of targets created for potential MR/US fusion biopsy  Peripheral zone: 1  Transition zone: 0       ASSESSMENT     1. Prostate cancer          PLAN:   1. Prostate cancer      We discussed his prostate cancer in depth. We reviewed his diagnosis, stage, grade, risk group, and prognosis. We discussed NCCN risk stratification and discussed the concept of low risk, intermediate  risk, and high risk disease. He has favorable intermediate risk disease.  We also reviewed the NCCN treatment nomogram. We discussed the different treatment options including active surveillance (as well as the surveillance protocol), radiation therapy, and robotic prostatectomy. We also discussed the advantages, disadvantages, risks and benefits, as well as complications of each option.    - Radiation therapy: we discussed treatment planning, the different techniques, short and long term complications including radiation cystitis, radiation proctitis, and impotence. We discussed success, failure, and salvage therapeutic options.     - Robotic prostatectomy: we discussed the surgery including preoperative preparation, surgical technique, postoperative recuperation and recovery. We reviewed short and long term complications including bleeding or injury to surrounding structures. We discussed the risk of anastomotic leakage and urethral stenosis. We discussed the risks of incontinence and impotence. We discussed the possibility of a positive margin or PSA persistence and its implications. We discussed pre-op and post-op Kegel exercises, post-op penile rehab, and treatment options for incontinence and impotence. We discussed cancer free survival and recurrence, as well as salvage therapeutic options. We discussed the possible indications for adjuvant radiation therapy.    - We discussed somatic testing with [facility]. We reviewed the indications, utility, and implications of testing    - He was given the opportunity to ask questions, and his questions and concerns were answered to his satisfaction.    - After discussion he elected to proceed with RALP with BPLND      - Recommend treatment of GG2 prostate cancer  - Will refer to Nando Snell MD to discuss RALP with BPLND. Appointment scheduled for tomorrow, 6/11/25.  - Patient declined referral to Rad Onc at this time after long discussion    Blayne Lipscomb  MD [facility] - PGY4    Newly diagnosed prostate cancer.  Ovidio 3 + 4 Prostate cancer.  The patient indicates understanding of these issues and agrees with the plan.   Pt would like a RALP for his prostate cancer treatment.  Will refer him to Dr. Snell for management of his prostate  cancer.    Follow up Dr. Nando Anguiano to discuss RALP for his prostate cancer.

## 2025-06-11 ENCOUNTER — OFFICE VISIT (OUTPATIENT)
Dept: UROLOGY | Facility: CLINIC | Age: 65
End: 2025-06-11
Payer: COMMERCIAL

## 2025-06-11 ENCOUNTER — TELEPHONE (OUTPATIENT)
Dept: RADIATION ONCOLOGY | Facility: CLINIC | Age: 65
End: 2025-06-11
Payer: COMMERCIAL

## 2025-06-11 VITALS
HEIGHT: 66 IN | HEART RATE: 47 BPM | SYSTOLIC BLOOD PRESSURE: 130 MMHG | WEIGHT: 119 LBS | BODY MASS INDEX: 19.13 KG/M2 | DIASTOLIC BLOOD PRESSURE: 61 MMHG

## 2025-06-11 DIAGNOSIS — C61 PROSTATE CANCER: Primary | ICD-10-CM

## 2025-06-11 PROCEDURE — 3008F BODY MASS INDEX DOCD: CPT | Mod: CPTII,S$GLB,, | Performed by: UROLOGY

## 2025-06-11 PROCEDURE — 3078F DIAST BP <80 MM HG: CPT | Mod: CPTII,S$GLB,, | Performed by: UROLOGY

## 2025-06-11 PROCEDURE — 99999 PR PBB SHADOW E&M-EST. PATIENT-LVL III: CPT | Mod: PBBFAC,,, | Performed by: UROLOGY

## 2025-06-11 PROCEDURE — G2211 COMPLEX E/M VISIT ADD ON: HCPCS | Mod: S$GLB,,, | Performed by: UROLOGY

## 2025-06-11 PROCEDURE — 3075F SYST BP GE 130 - 139MM HG: CPT | Mod: CPTII,S$GLB,, | Performed by: UROLOGY

## 2025-06-11 PROCEDURE — 1126F AMNT PAIN NOTED NONE PRSNT: CPT | Mod: CPTII,S$GLB,, | Performed by: UROLOGY

## 2025-06-11 PROCEDURE — 1159F MED LIST DOCD IN RCRD: CPT | Mod: CPTII,S$GLB,, | Performed by: UROLOGY

## 2025-06-11 PROCEDURE — 1101F PT FALLS ASSESS-DOCD LE1/YR: CPT | Mod: CPTII,S$GLB,, | Performed by: UROLOGY

## 2025-06-11 PROCEDURE — 99215 OFFICE O/P EST HI 40 MIN: CPT | Mod: S$GLB,,, | Performed by: UROLOGY

## 2025-06-11 PROCEDURE — 3288F FALL RISK ASSESSMENT DOCD: CPT | Mod: CPTII,S$GLB,, | Performed by: UROLOGY

## 2025-06-11 NOTE — PROGRESS NOTES
[facility]  Urologic Oncology      Date of Service: 06/11/2025    Urologic Oncology Problem List:  Intermediate Risk Favorable Prostate Cancer, PSA 6.6 at the time of diagnosis  Diagnosed on transrectal ultrasound-guided biopsy on 06/05/2025  Pathology with grade group 2 at the target lesion, 3+3 at the left base, left apex, right base  MRI on 05/21/2025 with PI-RADS 4 lesion no extraprostatic extension, total volume 27.8    History of Present Illness:   History of Present Illness    CHIEF COMPLAINT:  Mr. Byrnes presents today for discussion of recently diagnosed prostate cancer    PROSTATE CANCER HISTORY:  His PSA values have progressively increased over the past 6-7 years, starting from 1.3 and rising through 1.9, 2, 3, 4, 5, reaching approximately 6 two years ago. A scheduled biopsy two years ago was cancelled due to a torn retina that occurred one day prior.    His PSA trend is as follows:    Lab Results   Component Value Date    PSA 6.65 (H) 04/14/2025    PSA 3.5 01/17/2017    PSA 3.8 11/02/2015    PSATOTAL 6.6 (H) 01/05/2023         UROLOGICAL SYMPTOMS:  He denies any current urinary problems or erectile dysfunction, and does not require medications such as Viagra or Cialis for sexual function.     Imaging: I have reviewed the imaging study MRI of the prostate on 05/21/2025 personally, have independently interpreted this study, and agree with the findings    Allergies:  Review of patient's allergies indicates:  No Known Allergies     Medications per EMR:  Prescriptions Prior to Admission[1]    Past Medical History:  Past Medical History:   Diagnosis Date    Elevated PSA         Past Surgical History:  No past surgical history on file.     Family History:  Family History   Problem Relation Name Age of Onset    Diabetes Neg Hx      Heart disease Neg Hx      Hyperlipidemia Neg Hx      Stroke Neg Hx          Social History:  Social History     Tobacco Use    Smoking status: Never    Smokeless tobacco:  "Never   Substance Use Topics    Alcohol use: No     Alcohol/week: 0.0 standard drinks of alcohol          OBJECTIVE:     Vitals:    06/11/25 1040   BP: 130/61   BP Location: Left arm   Patient Position: Sitting   Pulse: (!) 47   Weight: 54 kg (119 lb)   Height: 5' 6" (1.676 m)        Physical Exam    General: No acute distress. Nontoxic appearing.  HENT: Normocephalic. Atraumatic.  Respiratory: Normal respiratory effort. No conversational dyspnea. No audible wheezing.  Abdomen: No obvious distension.  Skin: No visible abnormalities.  Extremities: No edema upper extremities. No edema lower extremities.  Neurological: Alert and oriented x3. Normal speech.  Psychiatric: Normal mood. Normal affect. No evidence of SI.        LABS:    CBC:  Lab Results   Component Value Date    WBC 4.96 01/05/2023    HGB 15.6 01/05/2023    HCT 46.2 01/05/2023    MCV 87 01/05/2023     01/05/2023         BMP:  Lab Results   Component Value Date     01/05/2023    K 4.4 01/05/2023     01/05/2023    CO2 26 01/05/2023    BUN 11 01/05/2023    CREATININE 0.9 01/05/2023    CALCIUM 9.4 01/05/2023    ANIONGAP 9 01/05/2023    EGFRNORACEVR >60 01/05/2023         ASSESSMENT/PLAN:     Assessment & Plan      PROSTATE CANCER:  - Discussed treatment options including active surveillance, radiation therapy, and surgery.  - Good candidate for either surgery or radiation given age and cancer characteristics.  - Explained intermediate risk favorable prostate cancer classification and implications.  - Clarified differences between localized prostate cancer treatment and metastatic disease management.  - Explained rationale for lymph node removal during surgery if performed.  - Active surveillance involves close monitoring with repeat biopsy within first year; ~30% require treatment within 10 years, 50 within 15 years  - Surgery (prostatectomy) involves removing entire prostate and lymph nodes.  - Radiation therapy discussed as alternative " option with different side effect profile.  - Discussed potential side effects of treatments:  -  Surgery: Immediate risk of incontinence and erectile dysfunction, improving over time  -  Radiation: Delayed side effects possible 5-7 years later (e.g. bladder/bowel overactivity).  - Referred to radiation oncologist Dr. Lainez to discuss radiation therapy options.    FOLLOW-UP:  - Follow up after radiation oncology consultation if patient has additional questions.     Overall, I thought the patient would be a good candidate for surgery and he seems motivated towards this, I did insist that he hear from a radiation oncologist for a full multidisciplinary consultation.  We discussed limitations of active surveillance given the 4/5 biopsies positive from the region of interest    I spent a total of 45 minutes on the day of the visit.This includes face to face time and non-face to face time preparing to see the patient (eg, review of tests), obtaining and/or reviewing separately obtained history, documenting clinical information in the electronic or other health record, independently interpreting results and communicating results to the patient/family/caregiver, or care coordinator    - code applied: patient requires or will require a continuous, longitudinal, and active collaborative plan of care related to this patient's health condition, prostate cancer --the management of which requires the direction of a practitioner with specialized clinical knowledge, skill, and expertise.     This encounter was dictated and transcribed using DeepScribe and FluencyDirect, please excuse any typographical or grammatical errors.           [1] (Not in a hospital admission)

## 2025-07-03 ENCOUNTER — OFFICE VISIT (OUTPATIENT)
Dept: RADIATION ONCOLOGY | Facility: CLINIC | Age: 65
End: 2025-07-03
Payer: COMMERCIAL

## 2025-07-03 VITALS
TEMPERATURE: 98 F | RESPIRATION RATE: 16 BRPM | SYSTOLIC BLOOD PRESSURE: 116 MMHG | BODY MASS INDEX: 19.95 KG/M2 | HEART RATE: 53 BPM | HEIGHT: 66 IN | OXYGEN SATURATION: 98 % | WEIGHT: 124.13 LBS | DIASTOLIC BLOOD PRESSURE: 67 MMHG

## 2025-07-03 DIAGNOSIS — C61 PROSTATE CANCER: ICD-10-CM

## 2025-07-03 PROCEDURE — 1160F RVW MEDS BY RX/DR IN RCRD: CPT | Mod: CPTII,S$GLB,, | Performed by: RADIOLOGY

## 2025-07-03 PROCEDURE — 3078F DIAST BP <80 MM HG: CPT | Mod: CPTII,S$GLB,, | Performed by: RADIOLOGY

## 2025-07-03 PROCEDURE — 1101F PT FALLS ASSESS-DOCD LE1/YR: CPT | Mod: CPTII,S$GLB,, | Performed by: RADIOLOGY

## 2025-07-03 PROCEDURE — 1159F MED LIST DOCD IN RCRD: CPT | Mod: CPTII,S$GLB,, | Performed by: RADIOLOGY

## 2025-07-03 PROCEDURE — 3008F BODY MASS INDEX DOCD: CPT | Mod: CPTII,S$GLB,, | Performed by: RADIOLOGY

## 2025-07-03 PROCEDURE — 99999 PR PBB SHADOW E&M-EST. PATIENT-LVL IV: CPT | Mod: PBBFAC,,, | Performed by: RADIOLOGY

## 2025-07-03 PROCEDURE — 3074F SYST BP LT 130 MM HG: CPT | Mod: CPTII,S$GLB,, | Performed by: RADIOLOGY

## 2025-07-03 PROCEDURE — 3288F FALL RISK ASSESSMENT DOCD: CPT | Mod: CPTII,S$GLB,, | Performed by: RADIOLOGY

## 2025-07-03 PROCEDURE — 1126F AMNT PAIN NOTED NONE PRSNT: CPT | Mod: CPTII,S$GLB,, | Performed by: RADIOLOGY

## 2025-07-03 PROCEDURE — 99204 OFFICE O/P NEW MOD 45 MIN: CPT | Mod: S$GLB,,, | Performed by: RADIOLOGY

## 2025-07-08 NOTE — PROGRESS NOTES
Multidisciplinary Uro-Oncology Clinic  [facility] - Radiation Oncology    HISTORY OF PRESENT ILLNESS:   This patient presents for discussion of treatment options for his prostate cancer.      Mr. Byrnes has a history of an elevated PSA dating back to 2023 when it returned at 6.6 ng/ml.  The patient was evaluated at [facility].  MRI revealed a PI-RADS 3 lesion.   Biopsy was recommended but was unable to be performed for insurance reasons.  Repeat PSA in April of 2025 returned at 6.65 ng/ml.  MRI on 5/21/25 revealed a 28 cc prostate with a 1.3 cm PI-RADS 4 lesion in the Lt. mid peripheral zone with no extraprostatic extension.  The seminal vesicles and neurovascular bundles were unremarkable.  There was no adenopathy.  Biopsies on 6/5/25 revealed Ovidio 7 (3+4) adenocarcinoma involving 50% of 4/5 cores from the #1 target.  The [place] pattern 4 accounted for 10% of the tumor.  There was [place] 6 (3+3) adenocarcinoma involving 15% of 2/2 cores from the Lt. apex, 5% of 1/2 cores from the Lt. base and 10% of 1/1 core from the Rt. base.      REVIEW OF SYSTEMS:   Review of Systems   Constitutional:  Negative for chills, fever, malaise/fatigue and weight loss.   Respiratory:  Negative for cough and shortness of breath.    Cardiovascular:  Negative for chest pain and palpitations.   Gastrointestinal:  Negative for abdominal pain, constipation, diarrhea and vomiting.   Genitourinary:  AUA 0,2,0,3,0,0,3, mostly satisfied  MARILU 22   notes increased frequency, No hematuria, urgency or dysuria.        PAST MEDICAL HISTORY:  Past Medical History:   Diagnosis Date    Elevated PSA        PAST SURGICAL HISTORY:  History reviewed. No pertinent surgical history.    ALLERGIES:   Review of patient's allergies indicates:  No Known Allergies    MEDICATIONS:  No current outpatient medications on file.     No current facility-administered medications for this visit.       SOCIAL HISTORY:  Social History[1]    FAMILY  HISTORY:  Family History   Problem Relation Name Age of Onset    Diabetes Neg Hx      Heart disease Neg Hx      Hyperlipidemia Neg Hx      Stroke Neg Hx           PHYSICAL EXAMINATION:  Vitals:    25 0922   BP: 116/67   Pulse: (!) 53   Resp: 16   Temp: 98.2 °F (36.8 °C)     Physical Exam  Constitutional:       General: He is not in acute distress.     Appearance: Normal appearance.   Neurological:      Mental Status: He is alert and oriented to person, place, and time.   Psychiatric:         Mood and Affect: Mood normal.         Judgment: Judgment normal.       ASSESSMENT/PLAN:  Clinical stage IIB (T1c, N0, M0, GG2, PSA < 10) prostate cancer    ECO    I had a long discussion with the patient and his wife.  Confirmed he is considered to have favorable intermediate risk prostate cancer.  Discussed the implications of this classification.  Discussed the options of active surveillance vs definitive treatment.  Explained active surveillance is an options although given his age, it is likely he  would be recommended for definitive therapy at some point.  Discussed the options of RALP with bilateral node dissection vs definitive radiotherapy either with external beam therapy via VMAT or possible prostate brachytherapy.  Discussed the pros and cons of each particular treatment.  Reviewed the acute and long term side effects of therapy.  He is currently considering RALP vs brachytherapy.  Explained either options are very reasonable given his presentation.  He will plan follow up with Dr. Snell.  Thank you for allowing us to participate in the care of this patient.      Psychosocial Distress screening score of Distress Score: 0 - No Distress noted and reviewed. No intervention indicated.     I spent approximately 50 minutes reviewing the available records and evaluating the patient, out of which over 50% of the time was spent face to face with the patient in counseling and coordinating this patient's care.              [1]   Social History  Socioeconomic History    Marital status:     Number of children: 2   Occupational History    Occupation:     Tobacco Use    Smoking status: Never    Smokeless tobacco: Never   Substance and Sexual Activity    Alcohol use: No     Alcohol/week: 0.0 standard drinks of alcohol    Drug use: No    Sexual activity: Yes     Partners: Female

## 2025-08-04 ENCOUNTER — OFFICE VISIT (OUTPATIENT)
Dept: UROLOGY | Facility: CLINIC | Age: 65
End: 2025-08-04
Payer: COMMERCIAL

## 2025-08-04 VITALS
HEIGHT: 66 IN | HEART RATE: 58 BPM | WEIGHT: 122.81 LBS | SYSTOLIC BLOOD PRESSURE: 113 MMHG | DIASTOLIC BLOOD PRESSURE: 57 MMHG | BODY MASS INDEX: 19.74 KG/M2

## 2025-08-04 DIAGNOSIS — C61 PROSTATE CANCER: Primary | ICD-10-CM

## 2025-08-04 PROCEDURE — 99999 PR PBB SHADOW E&M-EST. PATIENT-LVL III: CPT | Mod: PBBFAC,,, | Performed by: UROLOGY

## 2025-08-04 PROCEDURE — G2211 COMPLEX E/M VISIT ADD ON: HCPCS | Mod: S$GLB,,, | Performed by: UROLOGY

## 2025-08-04 PROCEDURE — 1101F PT FALLS ASSESS-DOCD LE1/YR: CPT | Mod: CPTII,S$GLB,, | Performed by: UROLOGY

## 2025-08-04 PROCEDURE — 3288F FALL RISK ASSESSMENT DOCD: CPT | Mod: CPTII,S$GLB,, | Performed by: UROLOGY

## 2025-08-04 PROCEDURE — 3078F DIAST BP <80 MM HG: CPT | Mod: CPTII,S$GLB,, | Performed by: UROLOGY

## 2025-08-04 PROCEDURE — 99215 OFFICE O/P EST HI 40 MIN: CPT | Mod: S$GLB,,, | Performed by: UROLOGY

## 2025-08-04 PROCEDURE — 1126F AMNT PAIN NOTED NONE PRSNT: CPT | Mod: CPTII,S$GLB,, | Performed by: UROLOGY

## 2025-08-04 PROCEDURE — 3008F BODY MASS INDEX DOCD: CPT | Mod: CPTII,S$GLB,, | Performed by: UROLOGY

## 2025-08-04 PROCEDURE — 3074F SYST BP LT 130 MM HG: CPT | Mod: CPTII,S$GLB,, | Performed by: UROLOGY

## 2025-08-04 NOTE — PROGRESS NOTES
[facility]  Urologic Oncology      Date of Service: 08/04/2025    Urologic Oncology Problem List:  Intermediate Risk Favorable Prostate Cancer, PSA 6.6 at the time of diagnosis  Diagnosed on transrectal ultrasound-guided biopsy on 06/05/2025  Pathology with grade group 2 at the target lesion, 3+3 at the left base, left apex, right base  MRI on 05/21/2025 with PI-RADS 4 lesion no extraprostatic extension, total volume 27.8    History of Present Illness:   History of Present Illness    CHIEF COMPLAINT:  Mr. Byrnes presents today to discuss treatment options for prostate cancer.    PROSTATE CANCER STATUS:  His PSA levels have remained relatively stable over the past 2-3 years, consistently around 6.5-6.6. He is considered a potential candidate for active surveillance.    His PSA trend is as follows:    Lab Results   Component Value Date    PSA 6.65 (H) 04/14/2025    PSA 3.5 01/17/2017    PSA 3.8 11/02/2015    PSATOTAL 6.6 (H) 01/05/2023     TREATMENT PREFERENCES:  He expresses preference for surgical intervention over radiation therapy, citing concern about potential loss of surgical eligibility if radiation is pursued first. He demonstrates understanding of all treatment options including surgery, radiation, and active surveillance. He expresses significant concern about potential cancer progression and spread to other organs, and is motivated to address prostate cancer definitively with surgical treatment.    SEXUAL HEALTH:  He denies erectile dysfunction and has never used erectile dysfunction medications. He reports being not sexually active at present.     Imaging: I have reviewed the imaging study MRI of the prostate on 05/21/2025 personally, have independently interpreted this study, and agree with the findings    Allergies:  Review of patient's allergies indicates:  No Known Allergies     Medications per EMR:  Prescriptions Prior to Admission[1]    Past Medical History:  Past Medical History:  "  Diagnosis Date    Elevated PSA         Past Surgical History:  No past surgical history on file.     Family History:  Family History   Problem Relation Name Age of Onset    Diabetes Neg Hx      Heart disease Neg Hx      Hyperlipidemia Neg Hx      Stroke Neg Hx          Social History:  Social History     Tobacco Use    Smoking status: Never    Smokeless tobacco: Never   Substance Use Topics    Alcohol use: No     Alcohol/week: 0.0 standard drinks of alcohol          OBJECTIVE:     Vitals:    08/04/25 0848   BP: (!) 113/57   BP Location: Left arm   Patient Position: Sitting   Pulse: (!) 58   Weight: 55.7 kg (122 lb 12.7 oz)   Height: 5' 6" (1.676 m)        Physical Exam    General: No acute distress. Nontoxic appearing.  HENT: Normocephalic. Atraumatic.  Respiratory: Normal respiratory effort. No conversational dyspnea. No audible wheezing.  Abdomen: No obvious distension.  Skin: No visible abnormalities.  Extremities: No edema upper extremities. No edema lower extremities.  Neurological: Alert and oriented x3. Normal speech.  Psychiatric: Normal mood. Normal affect. No evidence of SI.          LABS:    CBC:  Lab Results   Component Value Date    WBC 4.96 01/05/2023    HGB 15.6 01/05/2023    HCT 46.2 01/05/2023    MCV 87 01/05/2023     01/05/2023         BMP:  Lab Results   Component Value Date     01/05/2023    K 4.4 01/05/2023     01/05/2023    CO2 26 01/05/2023    BUN 11 01/05/2023    CREATININE 0.9 01/05/2023    CALCIUM 9.4 01/05/2023    ANIONGAP 9 01/05/2023    EGFRNORACEVR >60 01/05/2023         ASSESSMENT/PLAN:     Assessment & Plan      PROSTATE CANCER:  - Localized prostate cancer with options for treatment including surgery, radiation, or active surveillance.  - MRI shows cancer on edge of prostate but not growing outside, presenting a window of opportunity for intervention.  - Discussed risks and benefits of robotic prostatectomy, including potential for urinary incontinence and " erectile dysfunction.  - Active surveillance an option, noting 30% of men on surveillance require treatment within 10 years.  - Surgical intervention would provide curative intent and reduce long-term concerns, especially given current health and age.  - Genetic testing of prostate tissue available to assess cancer aggressiveness should the patient choose active surveillance  - Explained active surveillance program and its implications.  - Discussed potential progression of prostate cancer and risks of delaying treatment.  - Provided information on robotic prostatectomy procedure, including duration, catheterization, and recovery.  - Explained options for managing post-op erectile dysfunction, including medications and injections.  - Discussed Kegel exercises for improving post-op urinary control.  - Ordered PSA test.    FOLLOW-UP:  - Follow up in 1 week to inform of decision regarding surgery or active surveillance.  - If choosing active surveillance, follow up in 3 months for repeat PSA.  - If opting for surgery, follow up to discuss procedure in more detail.   - PSA ordered    I spent a total of 40 minutes on the day of the visit.This includes face to face time and non-face to face time preparing to see the patient (eg, review of tests), obtaining and/or reviewing separately obtained history, documenting clinical information in the electronic or other health record, independently interpreting results and communicating results to the patient/family/caregiver, or care coordinator    - code applied: patient requires or will require a continuous, longitudinal, and active collaborative plan of care related to this patient's health condition, prostate cancer --the management of which requires the direction of a practitioner with specialized clinical knowledge, skill, and expertise.     This encounter was dictated and transcribed using DeepScribe and [employer], please excuse any typographical or grammatical  errors.           [1] (Not in a hospital admission)

## 2025-08-29 ENCOUNTER — TELEPHONE (OUTPATIENT)
Dept: ORTHOPEDICS | Facility: CLINIC | Age: 65
End: 2025-08-29
Payer: COMMERCIAL

## 2025-08-29 DIAGNOSIS — M79.642 LEFT HAND PAIN: Primary | ICD-10-CM

## 2025-09-02 ENCOUNTER — TELEPHONE (OUTPATIENT)
Dept: ORTHOPEDICS | Facility: CLINIC | Age: 65
End: 2025-09-02
Payer: COMMERCIAL

## 2025-09-04 ENCOUNTER — HOSPITAL ENCOUNTER (OUTPATIENT)
Dept: RADIOLOGY | Facility: OTHER | Age: 65
Discharge: HOME | End: 2025-09-04
Attending: ORTHOPAEDIC SURGERY
Payer: COMMERCIAL

## 2025-09-04 ENCOUNTER — OFFICE VISIT (OUTPATIENT)
Dept: ORTHOPEDICS | Facility: CLINIC | Age: 65
End: 2025-09-04
Payer: COMMERCIAL

## 2025-09-04 VITALS — BODY MASS INDEX: 19.67 KG/M2 | HEIGHT: 66 IN | WEIGHT: 122.38 LBS

## 2025-09-04 DIAGNOSIS — M79.642 LEFT HAND PAIN: ICD-10-CM

## 2025-09-04 DIAGNOSIS — M79.642 LEFT HAND PAIN: Primary | ICD-10-CM

## 2025-09-04 PROCEDURE — 73130 X-RAY EXAM OF HAND: CPT | Mod: 26,LT,, | Performed by: STUDENT IN AN ORGANIZED HEALTH CARE EDUCATION/TRAINING PROGRAM

## 2025-09-04 PROCEDURE — 1159F MED LIST DOCD IN RCRD: CPT | Mod: CPTII,S$GLB,, | Performed by: ORTHOPAEDIC SURGERY

## 2025-09-04 PROCEDURE — 99204 OFFICE O/P NEW MOD 45 MIN: CPT | Mod: S$GLB,,, | Performed by: ORTHOPAEDIC SURGERY

## 2025-09-04 PROCEDURE — 3288F FALL RISK ASSESSMENT DOCD: CPT | Mod: CPTII,S$GLB,, | Performed by: ORTHOPAEDIC SURGERY

## 2025-09-04 PROCEDURE — 73130 X-RAY EXAM OF HAND: CPT | Mod: TC,LT

## 2025-09-04 PROCEDURE — 3008F BODY MASS INDEX DOCD: CPT | Mod: CPTII,S$GLB,, | Performed by: ORTHOPAEDIC SURGERY

## 2025-09-04 PROCEDURE — 99999 PR PBB SHADOW E&M-EST. PATIENT-LVL II: CPT | Mod: PBBFAC,,, | Performed by: ORTHOPAEDIC SURGERY

## 2025-09-04 PROCEDURE — 1101F PT FALLS ASSESS-DOCD LE1/YR: CPT | Mod: CPTII,S$GLB,, | Performed by: ORTHOPAEDIC SURGERY

## 2025-09-04 PROCEDURE — 1126F AMNT PAIN NOTED NONE PRSNT: CPT | Mod: CPTII,S$GLB,, | Performed by: ORTHOPAEDIC SURGERY
